# Patient Record
Sex: FEMALE | Race: WHITE | Employment: PART TIME | URBAN - METROPOLITAN AREA
[De-identification: names, ages, dates, MRNs, and addresses within clinical notes are randomized per-mention and may not be internally consistent; named-entity substitution may affect disease eponyms.]

---

## 2021-03-19 LAB — PAP SMEAR, EXTERNAL: NEGATIVE

## 2021-06-10 ENCOUNTER — TELEPHONE (OUTPATIENT)
Dept: PRIMARY CARE CLINIC | Age: 51
End: 2021-06-10

## 2021-06-16 ENCOUNTER — OFFICE VISIT (OUTPATIENT)
Dept: PRIMARY CARE CLINIC | Age: 51
End: 2021-06-16
Payer: COMMERCIAL

## 2021-06-16 VITALS
DIASTOLIC BLOOD PRESSURE: 66 MMHG | TEMPERATURE: 97.1 F | HEIGHT: 64 IN | OXYGEN SATURATION: 97 % | HEART RATE: 96 BPM | WEIGHT: 232 LBS | SYSTOLIC BLOOD PRESSURE: 124 MMHG | BODY MASS INDEX: 39.61 KG/M2

## 2021-06-16 DIAGNOSIS — G47.00 INSOMNIA, UNSPECIFIED TYPE: ICD-10-CM

## 2021-06-16 DIAGNOSIS — R06.00 DYSPNEA, UNSPECIFIED TYPE: Primary | ICD-10-CM

## 2021-06-16 DIAGNOSIS — F41.9 ANXIETY AND DEPRESSION: ICD-10-CM

## 2021-06-16 DIAGNOSIS — N92.1 MENORRHAGIA WITH IRREGULAR CYCLE: ICD-10-CM

## 2021-06-16 DIAGNOSIS — F90.9 ATTENTION DEFICIT HYPERACTIVITY DISORDER (ADHD), UNSPECIFIED ADHD TYPE: ICD-10-CM

## 2021-06-16 DIAGNOSIS — Z00.00 PREVENTATIVE HEALTH CARE: ICD-10-CM

## 2021-06-16 DIAGNOSIS — F32.A ANXIETY AND DEPRESSION: ICD-10-CM

## 2021-06-16 PROCEDURE — G8427 DOCREV CUR MEDS BY ELIG CLIN: HCPCS | Performed by: FAMILY MEDICINE

## 2021-06-16 PROCEDURE — 4004F PT TOBACCO SCREEN RCVD TLK: CPT | Performed by: FAMILY MEDICINE

## 2021-06-16 PROCEDURE — 99203 OFFICE O/P NEW LOW 30 MIN: CPT | Performed by: FAMILY MEDICINE

## 2021-06-16 PROCEDURE — G8419 CALC BMI OUT NRM PARAM NOF/U: HCPCS | Performed by: FAMILY MEDICINE

## 2021-06-16 RX ORDER — MEDROXYPROGESTERONE ACETATE 10 MG/1
TABLET ORAL
COMMUNITY
Start: 2021-06-13 | End: 2022-07-12

## 2021-06-16 RX ORDER — SERTRALINE HYDROCHLORIDE 100 MG/1
TABLET, FILM COATED ORAL
COMMUNITY
Start: 2021-06-14 | End: 2022-05-10 | Stop reason: ALTCHOICE

## 2021-06-16 RX ORDER — DEXTROAMPHETAMINE SACCHARATE, AMPHETAMINE ASPARTATE, DEXTROAMPHETAMINE SULFATE AND AMPHETAMINE SULFATE 2.5; 2.5; 2.5; 2.5 MG/1; MG/1; MG/1; MG/1
TABLET ORAL
COMMUNITY
Start: 2021-06-14 | End: 2021-08-17

## 2021-06-16 RX ORDER — TRAZODONE HYDROCHLORIDE 50 MG/1
TABLET ORAL
COMMUNITY
Start: 2021-06-14 | End: 2022-05-10 | Stop reason: DRUGHIGH

## 2021-06-16 SDOH — ECONOMIC STABILITY: FOOD INSECURITY: WITHIN THE PAST 12 MONTHS, YOU WORRIED THAT YOUR FOOD WOULD RUN OUT BEFORE YOU GOT MONEY TO BUY MORE.: NEVER TRUE

## 2021-06-16 SDOH — ECONOMIC STABILITY: FOOD INSECURITY: WITHIN THE PAST 12 MONTHS, THE FOOD YOU BOUGHT JUST DIDN'T LAST AND YOU DIDN'T HAVE MONEY TO GET MORE.: NEVER TRUE

## 2021-06-16 ASSESSMENT — COLUMBIA-SUICIDE SEVERITY RATING SCALE - C-SSRS
2. HAVE YOU ACTUALLY HAD ANY THOUGHTS OF KILLING YOURSELF?: NO
6. HAVE YOU EVER DONE ANYTHING, STARTED TO DO ANYTHING, OR PREPARED TO DO ANYTHING TO END YOUR LIFE?: NO
1. WITHIN THE PAST MONTH, HAVE YOU WISHED YOU WERE DEAD OR WISHED YOU COULD GO TO SLEEP AND NOT WAKE UP?: NO

## 2021-06-16 ASSESSMENT — PATIENT HEALTH QUESTIONNAIRE - PHQ9
5. POOR APPETITE OR OVEREATING: 1
4. FEELING TIRED OR HAVING LITTLE ENERGY: 1
SUM OF ALL RESPONSES TO PHQ QUESTIONS 1-9: 13
SUM OF ALL RESPONSES TO PHQ QUESTIONS 1-9: 13
SUM OF ALL RESPONSES TO PHQ9 QUESTIONS 1 & 2: 4
10. IF YOU CHECKED OFF ANY PROBLEMS, HOW DIFFICULT HAVE THESE PROBLEMS MADE IT FOR YOU TO DO YOUR WORK, TAKE CARE OF THINGS AT HOME, OR GET ALONG WITH OTHER PEOPLE: 1
8. MOVING OR SPEAKING SO SLOWLY THAT OTHER PEOPLE COULD HAVE NOTICED. OR THE OPPOSITE, BEING SO FIGETY OR RESTLESS THAT YOU HAVE BEEN MOVING AROUND A LOT MORE THAN USUAL: 3
6. FEELING BAD ABOUT YOURSELF - OR THAT YOU ARE A FAILURE OR HAVE LET YOURSELF OR YOUR FAMILY DOWN: 1
3. TROUBLE FALLING OR STAYING ASLEEP: 0
7. TROUBLE CONCENTRATING ON THINGS, SUCH AS READING THE NEWSPAPER OR WATCHING TELEVISION: 3
SUM OF ALL RESPONSES TO PHQ QUESTIONS 1-9: 13
1. LITTLE INTEREST OR PLEASURE IN DOING THINGS: 3
2. FEELING DOWN, DEPRESSED OR HOPELESS: 1
9. THOUGHTS THAT YOU WOULD BE BETTER OFF DEAD, OR OF HURTING YOURSELF: 0

## 2021-06-16 ASSESSMENT — ENCOUNTER SYMPTOMS
NAUSEA: 0
SHORTNESS OF BREATH: 1
VOMITING: 0

## 2021-06-16 ASSESSMENT — SOCIAL DETERMINANTS OF HEALTH (SDOH): HOW HARD IS IT FOR YOU TO PAY FOR THE VERY BASICS LIKE FOOD, HOUSING, MEDICAL CARE, AND HEATING?: SOMEWHAT HARD

## 2021-06-16 NOTE — PROGRESS NOTES
[facility]    Michele Fung presents to the office today for   Chief Complaint   Patient presents with    Shortness of Breath     New patient  Previous PCP Dr. Joann Barlow second opinion on her breathing    Shortness of breath  Since October 2020  Previously worked at [employer]  She thinks she was exposed to powder in the air which aggravated her breathing  She quit job due to breathing issues in Feb 2021  Previous to this job she never had any issues with her breathing  Previous PCP did Chest Xrays, Echo, and other testing  No pulmonary consultation  Inhaler and nebulizer have not been helpful    ADHD  New diagnosis through [facility]  Adderall is a new medication    Anxiety and Depression/ Insomnia  Seeing [facility] in [address] [place] [address] in [place]  Irregular menses  June - Nov 2020 no menses  Nov 2 very heavy bleeding  Jan 2021 no period  Feb, March period  Now intermittent regular  Ultrasound and biopsy negative    Review of Systems   Constitutional: Negative for chills and fever. Respiratory: Positive for shortness of breath. Gastrointestinal: Negative for nausea and vomiting. Genitourinary: Negative for dysuria, hematuria and urgency. Skin: Negative for rash. /66   Pulse 96   Temp 97.1 °F (36.2 °C) (Temporal)   Ht 5' 4\" (1.626 m)   Wt 232 lb (105.2 kg)   SpO2 97%   BMI 39.82 kg/m²   Physical Exam  Constitutional:       Appearance: Normal appearance. HENT:      Head: Normocephalic and atraumatic. Eyes:      Extraocular Movements: Extraocular movements intact. Conjunctiva/sclera: Conjunctivae normal.   Cardiovascular:      Rate and Rhythm: Normal rate. Heart sounds: Normal heart sounds. Pulmonary:      Effort: Pulmonary effort is normal.      Breath sounds: Normal breath sounds. Skin:     General: Skin is warm. Neurological:      Mental Status: She is alert and oriented to person, place, and time.    Psychiatric:         Mood and

## 2021-06-17 DIAGNOSIS — Z00.00 PREVENTATIVE HEALTH CARE: ICD-10-CM

## 2021-06-17 LAB
ALBUMIN SERPL-MCNC: 4.3 G/DL (ref 3.5–5.2)
ALP BLD-CCNC: 95 U/L (ref 35–104)
ALT SERPL-CCNC: 20 U/L (ref 0–32)
ANION GAP SERPL CALCULATED.3IONS-SCNC: 13 MMOL/L (ref 7–16)
AST SERPL-CCNC: 19 U/L (ref 0–31)
BILIRUB SERPL-MCNC: 0.4 MG/DL (ref 0–1.2)
BUN BLDV-MCNC: 14 MG/DL (ref 6–20)
CALCIUM SERPL-MCNC: 9.5 MG/DL (ref 8.6–10.2)
CHLORIDE BLD-SCNC: 107 MMOL/L (ref 98–107)
CHOLESTEROL, TOTAL: 234 MG/DL (ref 0–199)
CO2: 21 MMOL/L (ref 22–29)
CREAT SERPL-MCNC: 1.1 MG/DL (ref 0.5–1)
GFR AFRICAN AMERICAN: >60
GFR NON-AFRICAN AMERICAN: 53 ML/MIN/1.73
GLUCOSE BLD-MCNC: 69 MG/DL (ref 74–99)
HCT VFR BLD CALC: 48.7 % (ref 34–48)
HDLC SERPL-MCNC: 53 MG/DL
HEMOGLOBIN: 15.5 G/DL (ref 11.5–15.5)
LDL CHOLESTEROL CALCULATED: 161 MG/DL (ref 0–99)
MCH RBC QN AUTO: 28.8 PG (ref 26–35)
MCHC RBC AUTO-ENTMCNC: 31.8 % (ref 32–34.5)
MCV RBC AUTO: 90.4 FL (ref 80–99.9)
PDW BLD-RTO: 13.5 FL (ref 11.5–15)
PLATELET # BLD: 221 E9/L (ref 130–450)
PMV BLD AUTO: 10.6 FL (ref 7–12)
POTASSIUM SERPL-SCNC: 4.4 MMOL/L (ref 3.5–5)
RBC # BLD: 5.39 E12/L (ref 3.5–5.5)
SODIUM BLD-SCNC: 141 MMOL/L (ref 132–146)
TOTAL PROTEIN: 7.2 G/DL (ref 6.4–8.3)
TRIGL SERPL-MCNC: 102 MG/DL (ref 0–149)
TSH SERPL DL<=0.05 MIU/L-ACNC: 0.99 UIU/ML (ref 0.27–4.2)
VLDLC SERPL CALC-MCNC: 20 MG/DL
WBC # BLD: 5.5 E9/L (ref 4.5–11.5)

## 2021-07-19 ENCOUNTER — TELEPHONE (OUTPATIENT)
Dept: PRIMARY CARE CLINIC | Age: 51
End: 2021-07-19

## 2021-07-19 DIAGNOSIS — R06.00 DYSPNEA, UNSPECIFIED TYPE: Primary | ICD-10-CM

## 2021-07-19 NOTE — TELEPHONE ENCOUNTER
Saw prior records on file, and  per doctors 6/16 note: We need to get previous PCP records to confirm what testing has been completed and fill in work up. Dr. Jong Amato, please advise after your return at your earliest convenience. Patient notified provider unavailable in office this week, and that we will get back to her asap.

## 2021-07-19 NOTE — TELEPHONE ENCOUNTER
----- Message from Suys Jimenez sent at 7/19/2021 11:00 AM EDT -----  Subject: Message to Provider    QUESTIONS  Information for Provider? Patient called in to inquire if her medical   records had been sent over to the office from her former PCP's office. She   would also like to know if her current PCP advises for any further action; If she needs to schedule and apt with her PCP.  ---------------------------------------------------------------------------  --------------  CALL BACK INFO  What is the best way for the office to contact you? OK to leave message on   voicemail  Preferred Call Back Phone Number? [phone]  ---------------------------------------------------------------------------  --------------  SCRIPT ANSWERS  Relationship to Patient?  Self

## 2021-07-26 NOTE — TELEPHONE ENCOUNTER
Patient agreed to Pulmonology referral as recommended by provider, would like called once referral placed with pulmonary provider/facility information if possible. If appropriate please place referral order at your earliest convenience.

## 2021-07-27 ENCOUNTER — TELEPHONE (OUTPATIENT)
Dept: PRIMARY CARE CLINIC | Age: 51
End: 2021-07-27

## 2021-07-27 DIAGNOSIS — R06.00 DYSPNEA, UNSPECIFIED TYPE: Primary | ICD-10-CM

## 2021-07-27 NOTE — TELEPHONE ENCOUNTER
Tell pt Dr. Jaylin Lindsey is no longer in [place]  The closest pulmonologist would be in [facility] or [place]  Does she have a preference?

## 2021-07-27 NOTE — TELEPHONE ENCOUNTER
Dr. Arboleda [facility] office replied to referral stating the following:     Please be advised that Dr. Micki Crump is not with our practice any longer, so we can not accept this referral.    Juan Palmer and please advise at your earliest convenience.

## 2021-07-27 NOTE — TELEPHONE ENCOUNTER
Referral and PCP records faxed to Dr. Bonnetta Habermann office as ordered by doctor. Patient notified referral sent and they will call her to schedule. Gave Pulmonary office info to patient to call if needed to check the status.

## 2021-07-27 NOTE — TELEPHONE ENCOUNTER
Patient called back, does not have a specific Pulm doctor/name she is aware of, but requesting [place] area first if provider has any recommendations, then [place] ok if needed otherwise. Please advise.

## 2021-07-28 NOTE — TELEPHONE ENCOUNTER
See new telephone encounter placed if needed but, Dr. Duarte Gale referral cancelled, and referral for [facility] in [place] placed and faxed. Patient notified of update.

## 2021-07-28 NOTE — TELEPHONE ENCOUNTER
Referral and required provider records faxed to [facility]. Fax confirmation they received, obtained. They will call patient to schedule. See referral documentation if needed for further info. Jennifer Tam notified patient of update on referral/Pulm info.

## 2021-08-09 ENCOUNTER — OFFICE VISIT (OUTPATIENT)
Dept: PRIMARY CARE CLINIC | Age: 51
End: 2021-08-09
Payer: COMMERCIAL

## 2021-08-09 ENCOUNTER — TELEPHONE (OUTPATIENT)
Dept: PRIMARY CARE CLINIC | Age: 51
End: 2021-08-09

## 2021-08-09 VITALS
WEIGHT: 231 LBS | TEMPERATURE: 97.4 F | BODY MASS INDEX: 39.65 KG/M2 | OXYGEN SATURATION: 97 % | SYSTOLIC BLOOD PRESSURE: 120 MMHG | DIASTOLIC BLOOD PRESSURE: 74 MMHG | HEART RATE: 76 BPM

## 2021-08-09 DIAGNOSIS — R07.89 INTERMITTENT LEFT-SIDED CHEST PAIN: ICD-10-CM

## 2021-08-09 DIAGNOSIS — R00.2 PALPITATION: Primary | ICD-10-CM

## 2021-08-09 PROCEDURE — 1036F TOBACCO NON-USER: CPT | Performed by: NURSE PRACTITIONER

## 2021-08-09 PROCEDURE — 99213 OFFICE O/P EST LOW 20 MIN: CPT | Performed by: NURSE PRACTITIONER

## 2021-08-09 PROCEDURE — 93000 ELECTROCARDIOGRAM COMPLETE: CPT | Performed by: NURSE PRACTITIONER

## 2021-08-09 PROCEDURE — G8427 DOCREV CUR MEDS BY ELIG CLIN: HCPCS | Performed by: NURSE PRACTITIONER

## 2021-08-09 PROCEDURE — G8417 CALC BMI ABV UP PARAM F/U: HCPCS | Performed by: NURSE PRACTITIONER

## 2021-08-17 ENCOUNTER — OFFICE VISIT (OUTPATIENT)
Dept: PRIMARY CARE CLINIC | Age: 51
End: 2021-08-17
Payer: COMMERCIAL

## 2021-08-17 VITALS
SYSTOLIC BLOOD PRESSURE: 122 MMHG | BODY MASS INDEX: 39.27 KG/M2 | TEMPERATURE: 97 F | OXYGEN SATURATION: 98 % | DIASTOLIC BLOOD PRESSURE: 82 MMHG | HEIGHT: 64 IN | WEIGHT: 230 LBS | HEART RATE: 69 BPM

## 2021-08-17 DIAGNOSIS — R07.9 CHEST PAIN, UNSPECIFIED TYPE: Primary | ICD-10-CM

## 2021-08-17 DIAGNOSIS — Z12.31 SCREENING MAMMOGRAM, ENCOUNTER FOR: ICD-10-CM

## 2021-08-17 PROCEDURE — 1036F TOBACCO NON-USER: CPT | Performed by: FAMILY MEDICINE

## 2021-08-17 PROCEDURE — G8417 CALC BMI ABV UP PARAM F/U: HCPCS | Performed by: FAMILY MEDICINE

## 2021-08-17 PROCEDURE — 99213 OFFICE O/P EST LOW 20 MIN: CPT | Performed by: FAMILY MEDICINE

## 2021-08-17 PROCEDURE — G8427 DOCREV CUR MEDS BY ELIG CLIN: HCPCS | Performed by: FAMILY MEDICINE

## 2021-08-17 ASSESSMENT — ENCOUNTER SYMPTOMS
SHORTNESS OF BREATH: 0
WHEEZING: 0
VOMITING: 0
NAUSEA: 0

## 2021-08-17 NOTE — PROGRESS NOTES
[facility]    Jalen Gilliland presents to the office today for   Chief Complaint   Patient presents with    Palpitations     follow up     Palpitations/chest pain  Adderall stopped and palpitations resolved but still having intermittent chest pain  It does radiate into her left arm  Sometimes worse with exertion  No chest xray or stress test for this issue yet      Review of Systems   Constitutional: Negative for chills and fever. Respiratory: Negative for shortness of breath and wheezing. Cardiovascular: Positive for chest pain. Gastrointestinal: Negative for nausea and vomiting. Genitourinary: Negative for dysuria, hematuria and urgency. Skin: Negative for rash. Neurological: Negative for dizziness and light-headedness. /82   Pulse 69   Temp 97 °F (36.1 °C) (Temporal)   Ht 5' 4\" (1.626 m)   Wt 230 lb (104.3 kg)   SpO2 98%   BMI 39.48 kg/m²   Physical Exam  Constitutional:       Appearance: Normal appearance. HENT:      Head: Normocephalic and atraumatic. Eyes:      Extraocular Movements: Extraocular movements intact. Conjunctiva/sclera: Conjunctivae normal.   Cardiovascular:      Rate and Rhythm: Normal rate. Heart sounds: Normal heart sounds. Pulmonary:      Effort: Pulmonary effort is normal.      Breath sounds: Normal breath sounds. Skin:     General: Skin is warm. Neurological:      Mental Status: She is alert and oriented to person, place, and time. Psychiatric:         Mood and Affect: Mood normal.         Behavior: Behavior normal.            Current Outpatient Medications:     traZODone (DESYREL) 50 MG tablet, take 1 TO 2 tablets by mouth at bedtime, Disp: , Rfl:     sertraline (ZOLOFT) 100 MG tablet, take 1 tablet by mouth once daily, Disp: , Rfl:     medroxyPROGESTERone (PROVERA) 10 MG tablet, take 1 tablet by mouth once daily, Disp: , Rfl:      No past medical history on file. Krupa Mylesulices was seen today for palpitations.     Diagnoses and all orders for this visit:    Chest pain, unspecified type  -     CARDIAC STRESS TEST EXERCISE ONLY; Future  -     XR CHEST (2 VW); Future    Screening mammogram, encounter for  -     KANDACE ALEENA DIGITAL SCREEN BILATERAL PER PROTOCOL;  Future       CXR and stress test  ER precautions reviewed   Mammogram at [facility]

## 2021-09-16 DIAGNOSIS — R07.9 CHEST PAIN, UNSPECIFIED TYPE: ICD-10-CM

## 2021-09-21 DIAGNOSIS — R07.9 CHEST PAIN, UNSPECIFIED TYPE: Primary | ICD-10-CM

## 2021-09-23 ENCOUNTER — TELEPHONE (OUTPATIENT)
Dept: PRIMARY CARE CLINIC | Age: 51
End: 2021-09-23

## 2021-09-23 DIAGNOSIS — R07.9 CHEST PAIN, UNSPECIFIED TYPE: Primary | ICD-10-CM

## 2021-09-23 NOTE — TELEPHONE ENCOUNTER
Pt just checked with her insurance and [facility]. Could she please be referred there?

## 2021-09-23 NOTE — TELEPHONE ENCOUNTER
Spoke to Dr. Roque Quintanilla and they do not take pts insurance. Called and informed pt of this and told her to call her insurance and see who is a preferred provider and then to call me back.

## 2021-10-18 ENCOUNTER — HOSPITAL ENCOUNTER (OUTPATIENT)
Dept: MAMMOGRAPHY | Age: 51
Discharge: HOME | End: 2021-10-20
Payer: COMMERCIAL

## 2021-10-18 DIAGNOSIS — Z12.31 SCREENING MAMMOGRAM, ENCOUNTER FOR: ICD-10-CM

## 2021-10-18 PROCEDURE — 77063 BREAST TOMOSYNTHESIS BI: CPT

## 2021-11-04 ENCOUNTER — OFFICE VISIT (OUTPATIENT)
Dept: PRIMARY CARE CLINIC | Age: 51
End: 2021-11-04
Payer: COMMERCIAL

## 2021-11-04 VITALS
BODY MASS INDEX: 38.62 KG/M2 | OXYGEN SATURATION: 98 % | SYSTOLIC BLOOD PRESSURE: 112 MMHG | DIASTOLIC BLOOD PRESSURE: 82 MMHG | RESPIRATION RATE: 18 BRPM | WEIGHT: 225 LBS | HEART RATE: 76 BPM | TEMPERATURE: 97.7 F

## 2021-11-04 DIAGNOSIS — R07.9 CHEST PAIN, UNSPECIFIED TYPE: ICD-10-CM

## 2021-11-04 DIAGNOSIS — R07.9 CHEST PAIN, UNSPECIFIED TYPE: Primary | ICD-10-CM

## 2021-11-04 DIAGNOSIS — R05.9 COUGH: ICD-10-CM

## 2021-11-04 LAB
ALBUMIN SERPL-MCNC: 4.2 G/DL (ref 3.5–5.2)
ALP BLD-CCNC: 93 U/L (ref 35–104)
ALT SERPL-CCNC: 15 U/L (ref 0–32)
ANION GAP SERPL CALCULATED.3IONS-SCNC: 9 MMOL/L (ref 7–16)
AST SERPL-CCNC: 18 U/L (ref 0–31)
BASOPHILS ABSOLUTE: 0.03 E9/L (ref 0–0.2)
BASOPHILS RELATIVE PERCENT: 0.4 % (ref 0–2)
BILIRUB SERPL-MCNC: 0.4 MG/DL (ref 0–1.2)
BUN BLDV-MCNC: 12 MG/DL (ref 6–20)
CALCIUM SERPL-MCNC: 9.1 MG/DL (ref 8.6–10.2)
CHLORIDE BLD-SCNC: 104 MMOL/L (ref 98–107)
CO2: 24 MMOL/L (ref 22–29)
CREAT SERPL-MCNC: 0.9 MG/DL (ref 0.5–1)
D DIMER: <200 NG/ML DDU
EOSINOPHILS ABSOLUTE: 0.14 E9/L (ref 0.05–0.5)
EOSINOPHILS RELATIVE PERCENT: 1.8 % (ref 0–6)
GFR AFRICAN AMERICAN: >60
GFR NON-AFRICAN AMERICAN: >60 ML/MIN/1.73
GLUCOSE BLD-MCNC: 76 MG/DL (ref 74–99)
HCT VFR BLD CALC: 44.3 % (ref 34–48)
HEMOGLOBIN: 15 G/DL (ref 11.5–15.5)
IMMATURE GRANULOCYTES #: 0.02 E9/L
IMMATURE GRANULOCYTES %: 0.3 % (ref 0–5)
LYMPHOCYTES ABSOLUTE: 1.5 E9/L (ref 1.5–4)
LYMPHOCYTES RELATIVE PERCENT: 19.7 % (ref 20–42)
Lab: NORMAL
MCH RBC QN AUTO: 29.2 PG (ref 26–35)
MCHC RBC AUTO-ENTMCNC: 33.9 % (ref 32–34.5)
MCV RBC AUTO: 86.2 FL (ref 80–99.9)
MONOCYTES ABSOLUTE: 0.56 E9/L (ref 0.1–0.95)
MONOCYTES RELATIVE PERCENT: 7.3 % (ref 2–12)
NEUTROPHILS ABSOLUTE: 5.37 E9/L (ref 1.8–7.3)
NEUTROPHILS RELATIVE PERCENT: 70.5 % (ref 43–80)
PDW BLD-RTO: 12.8 FL (ref 11.5–15)
PERFORMING INSTRUMENT: NORMAL
PLATELET # BLD: 240 E9/L (ref 130–450)
PMV BLD AUTO: 10.3 FL (ref 7–12)
POTASSIUM SERPL-SCNC: 4.8 MMOL/L (ref 3.5–5)
QC PASS/FAIL: NORMAL
RBC # BLD: 5.14 E12/L (ref 3.5–5.5)
SARS-COV-2, POC: NORMAL
SODIUM BLD-SCNC: 137 MMOL/L (ref 132–146)
TOTAL PROTEIN: 7 G/DL (ref 6.4–8.3)
WBC # BLD: 7.6 E9/L (ref 4.5–11.5)

## 2021-11-04 PROCEDURE — 1036F TOBACCO NON-USER: CPT | Performed by: STUDENT IN AN ORGANIZED HEALTH CARE EDUCATION/TRAINING PROGRAM

## 2021-11-04 PROCEDURE — G8427 DOCREV CUR MEDS BY ELIG CLIN: HCPCS | Performed by: STUDENT IN AN ORGANIZED HEALTH CARE EDUCATION/TRAINING PROGRAM

## 2021-11-04 PROCEDURE — 93000 ELECTROCARDIOGRAM COMPLETE: CPT | Performed by: STUDENT IN AN ORGANIZED HEALTH CARE EDUCATION/TRAINING PROGRAM

## 2021-11-04 PROCEDURE — G8484 FLU IMMUNIZE NO ADMIN: HCPCS | Performed by: STUDENT IN AN ORGANIZED HEALTH CARE EDUCATION/TRAINING PROGRAM

## 2021-11-04 PROCEDURE — 87426 SARSCOV CORONAVIRUS AG IA: CPT | Performed by: STUDENT IN AN ORGANIZED HEALTH CARE EDUCATION/TRAINING PROGRAM

## 2021-11-04 PROCEDURE — G8417 CALC BMI ABV UP PARAM F/U: HCPCS | Performed by: STUDENT IN AN ORGANIZED HEALTH CARE EDUCATION/TRAINING PROGRAM

## 2021-11-04 PROCEDURE — 99214 OFFICE O/P EST MOD 30 MIN: CPT | Performed by: STUDENT IN AN ORGANIZED HEALTH CARE EDUCATION/TRAINING PROGRAM

## 2021-11-04 RX ORDER — LISDEXAMFETAMINE DIMESYLATE 20 MG/1
CAPSULE ORAL
COMMUNITY
Start: 2021-10-05 | End: 2022-05-10 | Stop reason: DRUGHIGH

## 2021-11-04 RX ORDER — FLUTICASONE PROPIONATE 220 UG/1
AEROSOL, METERED RESPIRATORY (INHALATION)
COMMUNITY
Start: 2021-10-29 | End: 2022-07-12 | Stop reason: ALTCHOICE

## 2021-11-04 RX ORDER — ALBUTEROL SULFATE 90 UG/1
AEROSOL, METERED RESPIRATORY (INHALATION)
COMMUNITY
Start: 2021-10-27 | End: 2022-10-31 | Stop reason: ALTCHOICE

## 2021-11-04 ASSESSMENT — ENCOUNTER SYMPTOMS
WHEEZING: 0
DIARRHEA: 0
NAUSEA: 0
VOMITING: 0
RHINORRHEA: 0
BLOOD IN STOOL: 0
SHORTNESS OF BREATH: 1
CONSTIPATION: 0
COUGH: 1
SORE THROAT: 0

## 2021-11-04 NOTE — PROGRESS NOTES
WALK-IN CARE CLINIC VISIT    21  Name: Mack Lopez   : 1970   Age: 54 y.o. Sex: female        Assessment & Plan:       ICD-10-CM    1. Chest pain, unspecified type  R07.9 EKG 12 lead     XR CHEST STANDARD (2 VW)     POCT COVID-19, Antigen     D-DIMER, QUANTITATIVE     CBC WITH AUTO DIFFERENTIAL     COMPREHENSIVE METABOLIC PANEL   2. Cough  R05.9        Chest pain is atypical in nature, reproducible on exam. Differential diagnosis includes ACS, PE, pneumonia, COVID, MSK pain. Vitals WNL. EKG NSR. Has recent stress test that was negative for ischemia although heart rate did not get high enough to exclude ischemia completely. Has chronic shortness of breath but also new cough. Rapid COVID test negative. Declined sendout COVID test. CXR unremarkable. Wells score for PE 0, low risk. However PERC rule 1, not PERC negative due to medroxyprogesterone use. Will check stat D-dimer. Will also check CBC, CMP in setting of chest pain and new cough. Discussed ED precautions with patient for worsening or new symptoms. Patient endorsed understanding. Will call with STAT labs, plan to send to ED for PE rule out if elevated D-dimer. Counseled patient regarding above diagnosis, including possible risks and complications. The patient verbalizes understanding, and is in agreement with the plan as detailed above. All educational materials and instructions were discussed and included on the After Visit Summary. All questions answered to the patient's satisfaction. The patient was advised to call for any concerns or questions. This provider was wearing N95 mask and shield. The patient was wearing surgical mask. We practiced social distancing when appropriate during visit due to COVID-19 pandemic. Subjective:     Chief Complaint   Patient presents with    Shoulder Pain     into right chest, hurts to breath, sob, non-prod.  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 HENT: Negative for congestion, rhinorrhea and sore throat. Respiratory: Positive for cough and shortness of breath. Negative for wheezing. Cardiovascular: Positive for chest pain and leg swelling. Negative for palpitations. Gastrointestinal: Negative for blood in stool, constipation, diarrhea, nausea and vomiting. Musculoskeletal: Positive for arthralgias. Skin: Negative for rash. Neurological: Positive for light-headedness and headaches (chronic). Medical History:     Patient Active Problem List   Diagnosis    Dyspnea    Attention deficit hyperactivity disorder (ADHD)    Menorrhagia with irregular cycle    Anxiety and depression    Insomnia        History reviewed. No pertinent past medical history. History reviewed. No pertinent surgical history. History reviewed. No pertinent family history.     Medications:     Current Outpatient Medications:     VYVANSE 20 MG CAPS, take 1 capsule by mouth every morning, Disp: , Rfl:     FLOVENT  MCG/ACT inhaler, inhale 2 puffs by mouth and INTO THE LUNGS twice a day, Disp: , Rfl:     albuterol sulfate  (90 Base) MCG/ACT inhaler, inhale 2 puffs by mouth and INTO THE LUNGS four times a day if needed for shortness of breath, Disp: , Rfl:     traZODone (DESYREL) 50 MG tablet, take 1 TO 2 tablets by mouth at bedtime, Disp: , Rfl:     sertraline (ZOLOFT) 100 MG tablet, take 1 tablet by mouth once daily, Disp: , Rfl:     medroxyPROGESTERone (PROVERA) 10 MG tablet, take 1 tablet by mouth once daily, Disp: , Rfl:     Allergies: Allergies   Allergen Reactions    Latex     Adderall [Amphetamine-Dextroamphetamine]     Nortriptyline     Propranolol     Topiramate     Wellbutrin [Bupropion]        Social History:     Social History     Socioeconomic History    Marital status: Unknown     Spouse name: Not on file    Number of children: Not on file    Years of education: Not on file    Highest education level: Not on file   Occupational History    Not on file   Tobacco Use    Smoking status: Former Smoker     Quit date:      Years since quittin.8    Smokeless tobacco: Never Used   Substance and Sexual Activity    Alcohol use: Not on file    Drug use: Not on file    Sexual activity: Not on file   Other Topics Concern    Not on file   Social History Narrative    Not on file     Social Determinants of Health     Financial Resource Strain: Medium Risk    Difficulty of Paying Living Expenses: Somewhat hard   Food Insecurity: No Food Insecurity    Worried About Running Out of Food in the Last Year: Never true    Cira of Food in the Last Year: Never true   Transportation Needs:     Lack of Transportation (Medical):      Lack of Transportation (Non-Medical):    Physical Activity:     Days of Exercise per Week:     Minutes of Exercise per Session:    Stress:     Feeling of Stress :    Social Connections:     Frequency of Communication with Friends and Family:     Frequency of Social Gatherings with Friends and Family:     Attends Yarsani Services:     Active Member of Clubs or Organizations:     Attends Club or Organization Meetings:     Marital Status:    Intimate Partner Violence:     Fear of Current or Ex-Partner:     Emotionally Abused:     Physically Abused:     Sexually Abused: Physical Exam:     Vitals:    11/04/21 0836 11/04/21 0851   BP: (!) 124/90 112/82   Site: Right Upper Arm Right Upper Arm   Position: Sitting Supine   Cuff Size: Large Adult Large Adult   Pulse: 76    Resp: 18    Temp: 97.7 °F (36.5 °C)    TempSrc: Temporal    SpO2: 98%    Weight: 225 lb (102.1 kg)         Physical Exam  Vitals and nursing note reviewed. Constitutional:       General: She is not in acute distress. Appearance: Normal appearance. She is obese. She is not ill-appearing or diaphoretic. HENT:      Mouth/Throat:      Mouth: Mucous membranes are moist.      Pharynx: Oropharynx is clear. Eyes:      Extraocular Movements: Extraocular movements intact. Conjunctiva/sclera: Conjunctivae normal.   Cardiovascular:      Rate and Rhythm: Normal rate and regular rhythm. Pulses: Normal pulses. Heart sounds: Normal heart sounds. No murmur heard. No friction rub. No gallop. Pulmonary:      Effort: Pulmonary effort is normal. No respiratory distress. Breath sounds: Normal breath sounds. No stridor. No wheezing, rhonchi or rales. Chest:      Chest wall: Tenderness (right delano-lateral chest wall) present. No mass, lacerations, deformity, swelling, crepitus or edema. Comments: No skin changes. Musculoskeletal:      Right shoulder: Normal. No tenderness. Thoracic back: Tenderness (radiating from right side) present. Right lower leg: Edema (trace) present. Left lower leg: Edema (trace) present. Skin:     General: Skin is warm and dry. Capillary Refill: Capillary refill takes less than 2 seconds. Neurological:      Mental Status: She is alert and oriented to person, place, and time.    Psychiatric:         Mood and Affect: Mood normal.         Behavior: Behavior normal.         Testing:     Orders Placed This Encounter   Procedures    XR CHEST STANDARD (2 VW)     Standing Status:   Future     Number of Occurrences:   1     Standing Expiration Date: 11/4/2022     Order Specific Question:   Reason for exam:     Answer:   right side and chest pain    D-DIMER, QUANTITATIVE     Standing Status:   Future     Number of Occurrences:   1     Standing Expiration Date:   11/4/2022    CBC WITH AUTO DIFFERENTIAL     Standing Status:   Future     Number of Occurrences:   1     Standing Expiration Date:   11/4/2022    COMPREHENSIVE METABOLIC PANEL     Standing Status:   Future     Number of Occurrences:   1     Standing Expiration Date:   11/4/2022    POCT COVID-19, Antigen     Order Specific Question:   Is this test for diagnosis or screening? Answer:   Diagnosis of ill patient     Order Specific Question:   Symptomatic for COVID-19 as defined by CDC? Answer:   Yes     Order Specific Question:   Date of Symptom Onset     Answer:   11/1/2021     Order Specific Question:   Hospitalized for COVID-19? Answer:   No     Order Specific Question:   Admitted to ICU for COVID-19? Answer:   No     Order Specific Question:   Employed in healthcare setting? Answer:   No     Order Specific Question:   Resident in a congregate (group) care setting? Answer:   No     Order Specific Question:   Pregnant? Answer:   No    EKG 12 lead     Order Specific Question:   Reason for Exam?     Answer:   Chest pain        Recent Results (from the past 24 hour(s))   POCT COVID-19, Antigen    Collection Time: 11/04/21  9:14 AM   Result Value Ref Range    SARS-COV-2, POC Not-Detected Not Detected    Lot Number [id number]     QC Pass/Fail pass     Performing Instrument BD Veritor      Narrative   EXAMINATION:   TWO XRAY VIEWS OF THE CHEST       11/4/2021 9:19 am       COMPARISON:   None.       HISTORY:   ORDERING SYSTEM PROVIDED HISTORY: Chest pain, unspecified type   TECHNOLOGIST PROVIDED HISTORY:   Reason for exam:->right side and chest pain       FINDINGS:   The lungs are without acute focal process.  There is no effusion or   pneumothorax.  The cardiomediastinal silhouette

## 2022-05-10 ENCOUNTER — OFFICE VISIT (OUTPATIENT)
Dept: PRIMARY CARE CLINIC | Age: 52
End: 2022-05-10
Payer: COMMERCIAL

## 2022-05-10 VITALS
OXYGEN SATURATION: 96 % | HEART RATE: 75 BPM | SYSTOLIC BLOOD PRESSURE: 115 MMHG | HEIGHT: 64 IN | WEIGHT: 205.8 LBS | DIASTOLIC BLOOD PRESSURE: 86 MMHG | TEMPERATURE: 97.8 F | BODY MASS INDEX: 35.13 KG/M2

## 2022-05-10 DIAGNOSIS — K59.00 CONSTIPATION, UNSPECIFIED CONSTIPATION TYPE: ICD-10-CM

## 2022-05-10 DIAGNOSIS — K64.9 HEMORRHOIDS, UNSPECIFIED HEMORRHOID TYPE: ICD-10-CM

## 2022-05-10 DIAGNOSIS — I49.3 SYMPTOMATIC PVCS: ICD-10-CM

## 2022-05-10 DIAGNOSIS — Z76.89 ENCOUNTER TO ESTABLISH CARE WITH NEW DOCTOR: Primary | ICD-10-CM

## 2022-05-10 DIAGNOSIS — K21.9 GASTROESOPHAGEAL REFLUX DISEASE, UNSPECIFIED WHETHER ESOPHAGITIS PRESENT: ICD-10-CM

## 2022-05-10 DIAGNOSIS — R06.09 DYSPNEA ON EXERTION: ICD-10-CM

## 2022-05-10 DIAGNOSIS — Z12.12 SCREENING FOR COLORECTAL CANCER: ICD-10-CM

## 2022-05-10 DIAGNOSIS — Z12.11 SCREENING FOR COLORECTAL CANCER: ICD-10-CM

## 2022-05-10 PROBLEM — G43.909 MIGRAINE: Status: ACTIVE | Noted: 2018-09-18

## 2022-05-10 PROCEDURE — G8417 CALC BMI ABV UP PARAM F/U: HCPCS | Performed by: STUDENT IN AN ORGANIZED HEALTH CARE EDUCATION/TRAINING PROGRAM

## 2022-05-10 PROCEDURE — 99214 OFFICE O/P EST MOD 30 MIN: CPT | Performed by: STUDENT IN AN ORGANIZED HEALTH CARE EDUCATION/TRAINING PROGRAM

## 2022-05-10 PROCEDURE — 1036F TOBACCO NON-USER: CPT | Performed by: STUDENT IN AN ORGANIZED HEALTH CARE EDUCATION/TRAINING PROGRAM

## 2022-05-10 PROCEDURE — 3017F COLORECTAL CA SCREEN DOC REV: CPT | Performed by: STUDENT IN AN ORGANIZED HEALTH CARE EDUCATION/TRAINING PROGRAM

## 2022-05-10 PROCEDURE — G8427 DOCREV CUR MEDS BY ELIG CLIN: HCPCS | Performed by: STUDENT IN AN ORGANIZED HEALTH CARE EDUCATION/TRAINING PROGRAM

## 2022-05-10 RX ORDER — LISDEXAMFETAMINE DIMESYLATE 30 MG/1
CAPSULE ORAL
COMMUNITY
Start: 2022-05-02 | End: 2022-07-12

## 2022-05-10 RX ORDER — TRAZODONE HYDROCHLORIDE 150 MG/1
300 TABLET ORAL
COMMUNITY
Start: 2022-05-02

## 2022-05-10 RX ORDER — POLYETHYLENE GLYCOL 3350 17 G/17G
17 POWDER, FOR SOLUTION ORAL DAILY PRN
Qty: 510 G | Refills: 5 | Status: SHIPPED
Start: 2022-05-10 | End: 2022-10-31 | Stop reason: ALTCHOICE

## 2022-05-10 RX ORDER — OMEPRAZOLE 40 MG/1
40 CAPSULE, DELAYED RELEASE ORAL
Qty: 30 CAPSULE | Refills: 5 | Status: SHIPPED
Start: 2022-05-10 | End: 2022-10-24

## 2022-05-10 RX ORDER — VORTIOXETINE 10 MG/1
TABLET, FILM COATED ORAL
COMMUNITY
Start: 2022-05-03 | End: 2022-08-25

## 2022-05-10 ASSESSMENT — ENCOUNTER SYMPTOMS
EYE DISCHARGE: 1
NAUSEA: 1
ANAL BLEEDING: 1
COUGH: 0
DIARRHEA: 0
CONSTIPATION: 1
ABDOMINAL PAIN: 1
SHORTNESS OF BREATH: 1

## 2022-05-10 NOTE — PROGRESS NOTES
NEW PRIMARY CARE VISIT    5/10/22  Name: Trang Hobbs   : 1970   Age: 50 y.o. Sex: female        Assessment & Plan:     Problem List Items Addressed This Visit        Circulatory    Symptomatic PVCs     Normal stress test  Unable to tolerate beta-blocker  Continue aspirin         Relevant Medications    ASPIRIN 81 PO    Hemorrhoids     Secondary to constipation  Start MiraLAX         Relevant Medications    polyethylene glycol (GLYCOLAX) 17 GM/SCOOP powder       Digestive    Gastroesophageal reflux disease     Start omeprazole  Referral to GI, consider scope if not improved         Relevant Medications    polyethylene glycol (GLYCOLAX) 17 GM/SCOOP powder    omeprazole (PRILOSEC) 40 MG delayed release capsule       Other    Constipation     Start MiraLAX  Referral to GI for colonoscopy         Relevant Medications    polyethylene glycol (GLYCOLAX) 17 GM/SCOOP powder    Dyspnea     Possible allergies versus asthma, PFTs unremarkable  Was started on Flovent, discussed proper use and recommended to continue using prior to pulmonology follow-up  Continue albuterol as needed           Other Visit Diagnoses     Encounter to establish care with new doctor    -  Primary    History reviewed and updated    Screening for colorectal cancer        Relevant Orders    External Referral To Gastroenterology          Counseled patient regarding above diagnosis, including possible risks and complications, especially if left uncontrolled. Counseled patient as appropriate and relevant regarding any possible side effects, risks, and alternatives to treatment; the patient verbalizes understanding, and is in agreement with the plan as detailed above. All educational materials and instructions were discussed and included on the After Visit Summary. All questions answered to the patient's satisfaction. The patient was advised to call or send [employer] message for any concerns prior to next appointment.     Return in about 2 months (around 7/10/2022) for follow-up nausea, cough. This provider and patient were wearing surgical masks and practiced social distancing when appropriate during visit due to COVID-19 pandemic. Subjective:     Chief Complaint   Patient presents with    New Patient     physical exam       Patient needs new PCP. Last PCP changed practice locations. Saw cardiology Dr. James Kidd. Was on heart monitor for 2 weeks. Intermittent extra beats. Otherwise normal. Normal stress test.  Was not able to tolerate metoprolol. Was started on aspirin for cardiovascular disease prevention. Saw pulmonology in [place]. Was started on Flovent. Has not been using as prescribed. Follows with psych. Follows with OB for Provera for AUB. Review of Systems   Constitutional: Negative for chills and fever. HENT: Positive for congestion. Eyes: Positive for discharge (watery). Negative for visual disturbance. Respiratory: Positive for shortness of breath. Negative for cough. Cardiovascular: Positive for palpitations. Negative for chest pain and leg swelling. Gastrointestinal: Positive for abdominal pain, anal bleeding (hemorrhoids), constipation and nausea. Negative for diarrhea. Genitourinary: Positive for menstrual problem. Negative for difficulty urinating and dysuria. Allergic/Immunologic: Positive for environmental allergies. Medical History:   History reviewed and updated as needed. Patient Active Problem List   Diagnosis    Dyspnea    Attention deficit hyperactivity disorder (ADHD)    Menorrhagia with irregular cycle    Anxiety and depression    Insomnia    Migraine    Symptomatic PVCs        History reviewed. No pertinent past medical history.     Past Surgical History:   Procedure Laterality Date    TUBAL LIGATION      VENTRAL HERNIA REPAIR      WISDOM TOOTH EXTRACTION         Family History   Problem Relation Age of Onset    Stroke Mother     Heart Disease Mother     Heart Conjunctiva/sclera: Conjunctivae normal.   Cardiovascular:      Rate and Rhythm: Normal rate and regular rhythm. Heart sounds: Normal heart sounds. Pulmonary:      Effort: Pulmonary effort is normal. No respiratory distress. Breath sounds: Normal breath sounds. Abdominal:      General: Abdomen is flat. Bowel sounds are normal.      Palpations: Abdomen is soft. There is no mass. Tenderness: There is abdominal tenderness in the epigastric area. There is no right CVA tenderness, left CVA tenderness, guarding or rebound. Musculoskeletal:      Right lower leg: No edema. Left lower leg: No edema. Skin:     General: Skin is warm and dry. Neurological:      Mental Status: She is alert and oriented to person, place, and time. Psychiatric:         Mood and Affect: Mood normal.         Behavior: Behavior normal.         Testing:     Orders Placed This Encounter   Procedures    External Referral To Gastroenterology     Referral Priority:   Routine     Referral Type:   Eval and Treat     Referral Reason:   Specialty Services Required     Requested Specialty:   Gastroenterology     Number of Visits Requested:   1        No results found for this or any previous visit (from the past 24 hour(s)).

## 2022-05-10 NOTE — PATIENT INSTRUCTIONS
Vince Darling M.D.   Address: [facility] [address] [place] [address]  Phone: [phone]

## 2022-05-16 ENCOUNTER — TELEPHONE (OUTPATIENT)
Dept: PRIMARY CARE CLINIC | Age: 52
End: 2022-05-16

## 2022-05-17 PROBLEM — K59.00 CONSTIPATION: Status: ACTIVE | Noted: 2022-05-17

## 2022-05-17 PROBLEM — K21.9 GASTROESOPHAGEAL REFLUX DISEASE: Status: ACTIVE | Noted: 2022-05-17

## 2022-05-17 PROBLEM — K64.9 HEMORRHOIDS: Status: ACTIVE | Noted: 2022-05-17

## 2022-05-17 NOTE — ASSESSMENT & PLAN NOTE
Possible allergies versus asthma, PFTs unremarkable  Was started on Flovent, discussed proper use and recommended to continue using prior to pulmonology follow-up  Continue albuterol as needed

## 2022-07-12 ENCOUNTER — OFFICE VISIT (OUTPATIENT)
Dept: PRIMARY CARE CLINIC | Age: 52
End: 2022-07-12
Payer: COMMERCIAL

## 2022-07-12 VITALS
HEART RATE: 88 BPM | HEIGHT: 64 IN | WEIGHT: 208 LBS | DIASTOLIC BLOOD PRESSURE: 72 MMHG | SYSTOLIC BLOOD PRESSURE: 110 MMHG | BODY MASS INDEX: 35.51 KG/M2 | OXYGEN SATURATION: 97 % | TEMPERATURE: 97.1 F

## 2022-07-12 DIAGNOSIS — K21.9 GASTROESOPHAGEAL REFLUX DISEASE, UNSPECIFIED WHETHER ESOPHAGITIS PRESENT: ICD-10-CM

## 2022-07-12 DIAGNOSIS — R63.1 POLYDIPSIA: ICD-10-CM

## 2022-07-12 DIAGNOSIS — R35.89 POLYURIA: ICD-10-CM

## 2022-07-12 DIAGNOSIS — K59.00 CONSTIPATION, UNSPECIFIED CONSTIPATION TYPE: Primary | ICD-10-CM

## 2022-07-12 DIAGNOSIS — K64.9 HEMORRHOIDS, UNSPECIFIED HEMORRHOID TYPE: ICD-10-CM

## 2022-07-12 DIAGNOSIS — E78.00 PURE HYPERCHOLESTEROLEMIA: ICD-10-CM

## 2022-07-12 DIAGNOSIS — Z91.09 ENVIRONMENTAL ALLERGIES: ICD-10-CM

## 2022-07-12 DIAGNOSIS — K59.00 CONSTIPATION, UNSPECIFIED CONSTIPATION TYPE: ICD-10-CM

## 2022-07-12 DIAGNOSIS — R06.09 DYSPNEA ON EXERTION: ICD-10-CM

## 2022-07-12 LAB
ALBUMIN SERPL-MCNC: 4 G/DL (ref 3.5–5.2)
ALP BLD-CCNC: 89 U/L (ref 35–104)
ALT SERPL-CCNC: 17 U/L (ref 0–32)
ANION GAP SERPL CALCULATED.3IONS-SCNC: 13 MMOL/L (ref 7–16)
AST SERPL-CCNC: 22 U/L (ref 0–31)
BILIRUB SERPL-MCNC: 0.4 MG/DL (ref 0–1.2)
BUN BLDV-MCNC: 14 MG/DL (ref 6–20)
CALCIUM SERPL-MCNC: 9 MG/DL (ref 8.6–10.2)
CHLORIDE BLD-SCNC: 105 MMOL/L (ref 98–107)
CHOLESTEROL, FASTING: 192 MG/DL (ref 0–199)
CO2: 23 MMOL/L (ref 22–29)
CREAT SERPL-MCNC: 1.3 MG/DL (ref 0.5–1)
GFR AFRICAN AMERICAN: 52
GFR NON-AFRICAN AMERICAN: 43 ML/MIN/1.73
GLUCOSE FASTING: 70 MG/DL (ref 74–99)
HBA1C MFR BLD: 4.7 % (ref 4–5.6)
HCT VFR BLD CALC: 42.2 % (ref 34–48)
HDLC SERPL-MCNC: 56 MG/DL
HEMOGLOBIN: 14 G/DL (ref 11.5–15.5)
LDL CHOLESTEROL CALCULATED: 119 MG/DL (ref 0–99)
MCH RBC QN AUTO: 29 PG (ref 26–35)
MCHC RBC AUTO-ENTMCNC: 33.2 % (ref 32–34.5)
MCV RBC AUTO: 87.6 FL (ref 80–99.9)
PDW BLD-RTO: 12.8 FL (ref 11.5–15)
PLATELET # BLD: 217 E9/L (ref 130–450)
PMV BLD AUTO: 10.6 FL (ref 7–12)
POTASSIUM SERPL-SCNC: 4.4 MMOL/L (ref 3.5–5)
RBC # BLD: 4.82 E12/L (ref 3.5–5.5)
SODIUM BLD-SCNC: 141 MMOL/L (ref 132–146)
TOTAL PROTEIN: 6.8 G/DL (ref 6.4–8.3)
TRIGLYCERIDE, FASTING: 83 MG/DL (ref 0–149)
TSH SERPL DL<=0.05 MIU/L-ACNC: 1.3 UIU/ML (ref 0.27–4.2)
VLDLC SERPL CALC-MCNC: 17 MG/DL
WBC # BLD: 5.1 E9/L (ref 4.5–11.5)

## 2022-07-12 PROCEDURE — 3017F COLORECTAL CA SCREEN DOC REV: CPT | Performed by: STUDENT IN AN ORGANIZED HEALTH CARE EDUCATION/TRAINING PROGRAM

## 2022-07-12 PROCEDURE — G8427 DOCREV CUR MEDS BY ELIG CLIN: HCPCS | Performed by: STUDENT IN AN ORGANIZED HEALTH CARE EDUCATION/TRAINING PROGRAM

## 2022-07-12 PROCEDURE — 1036F TOBACCO NON-USER: CPT | Performed by: STUDENT IN AN ORGANIZED HEALTH CARE EDUCATION/TRAINING PROGRAM

## 2022-07-12 PROCEDURE — G8417 CALC BMI ABV UP PARAM F/U: HCPCS | Performed by: STUDENT IN AN ORGANIZED HEALTH CARE EDUCATION/TRAINING PROGRAM

## 2022-07-12 PROCEDURE — 99214 OFFICE O/P EST MOD 30 MIN: CPT | Performed by: STUDENT IN AN ORGANIZED HEALTH CARE EDUCATION/TRAINING PROGRAM

## 2022-07-12 RX ORDER — FAMOTIDINE 40 MG/1
40 TABLET, FILM COATED ORAL EVERY EVENING
Qty: 30 TABLET | Refills: 2 | Status: SHIPPED
Start: 2022-07-12 | End: 2022-10-03

## 2022-07-12 RX ORDER — LISDEXAMFETAMINE DIMESYLATE 40 MG/1
CAPSULE ORAL
COMMUNITY
Start: 2022-06-28

## 2022-07-12 RX ORDER — LORATADINE 10 MG/1
10 TABLET ORAL DAILY
Qty: 30 TABLET | Refills: 2 | Status: SHIPPED
Start: 2022-07-12 | End: 2022-08-25 | Stop reason: SDUPTHER

## 2022-07-12 RX ORDER — DIAPER,BRIEF,INFANT-TODD,DISP
EACH MISCELLANEOUS
Qty: 30 G | Refills: 1 | Status: SHIPPED | OUTPATIENT
Start: 2022-07-12 | End: 2022-07-19

## 2022-07-12 RX ORDER — MOMETASONE FUROATE 220 UG/1
2 INHALANT RESPIRATORY (INHALATION) DAILY
Qty: 1 EACH | Refills: 2 | Status: SHIPPED
Start: 2022-07-12 | End: 2022-10-31 | Stop reason: ALTCHOICE

## 2022-07-12 SDOH — ECONOMIC STABILITY: FOOD INSECURITY: WITHIN THE PAST 12 MONTHS, YOU WORRIED THAT YOUR FOOD WOULD RUN OUT BEFORE YOU GOT MONEY TO BUY MORE.: NEVER TRUE

## 2022-07-12 SDOH — ECONOMIC STABILITY: FOOD INSECURITY: WITHIN THE PAST 12 MONTHS, THE FOOD YOU BOUGHT JUST DIDN'T LAST AND YOU DIDN'T HAVE MONEY TO GET MORE.: NEVER TRUE

## 2022-07-12 ASSESSMENT — ENCOUNTER SYMPTOMS
CONSTIPATION: 1
COUGH: 0
ANAL BLEEDING: 1
SHORTNESS OF BREATH: 1
NAUSEA: 1
VOMITING: 0
EYE DISCHARGE: 1
ABDOMINAL PAIN: 1
DIARRHEA: 0

## 2022-07-12 ASSESSMENT — PATIENT HEALTH QUESTIONNAIRE - PHQ9
6. FEELING BAD ABOUT YOURSELF - OR THAT YOU ARE A FAILURE OR HAVE LET YOURSELF OR YOUR FAMILY DOWN: 0
9. THOUGHTS THAT YOU WOULD BE BETTER OFF DEAD, OR OF HURTING YOURSELF: 0
1. LITTLE INTEREST OR PLEASURE IN DOING THINGS: 2
10. IF YOU CHECKED OFF ANY PROBLEMS, HOW DIFFICULT HAVE THESE PROBLEMS MADE IT FOR YOU TO DO YOUR WORK, TAKE CARE OF THINGS AT HOME, OR GET ALONG WITH OTHER PEOPLE: 1
2. FEELING DOWN, DEPRESSED OR HOPELESS: 1
SUM OF ALL RESPONSES TO PHQ9 QUESTIONS 1 & 2: 3
7. TROUBLE CONCENTRATING ON THINGS, SUCH AS READING THE NEWSPAPER OR WATCHING TELEVISION: 0
SUM OF ALL RESPONSES TO PHQ QUESTIONS 1-9: 6
5. POOR APPETITE OR OVEREATING: 1
4. FEELING TIRED OR HAVING LITTLE ENERGY: 1
3. TROUBLE FALLING OR STAYING ASLEEP: 0
SUM OF ALL RESPONSES TO PHQ QUESTIONS 1-9: 6
8. MOVING OR SPEAKING SO SLOWLY THAT OTHER PEOPLE COULD HAVE NOTICED. OR THE OPPOSITE, BEING SO FIGETY OR RESTLESS THAT YOU HAVE BEEN MOVING AROUND A LOT MORE THAN USUAL: 1
SUM OF ALL RESPONSES TO PHQ QUESTIONS 1-9: 6
SUM OF ALL RESPONSES TO PHQ QUESTIONS 1-9: 6

## 2022-07-12 ASSESSMENT — SOCIAL DETERMINANTS OF HEALTH (SDOH): HOW HARD IS IT FOR YOU TO PAY FOR THE VERY BASICS LIKE FOOD, HOUSING, MEDICAL CARE, AND HEATING?: NOT HARD AT ALL

## 2022-07-12 NOTE — PROGRESS NOTES
ESTABLISHED PRIMARY CARE VISIT    24  Name: Sri Siddiqui   : 1970   Age: 50 y.o.   Sex: female        Assessment & Plan:     Problem List Items Addressed This Visit        Circulatory    Hemorrhoids     Secondary to constipation, see plan  Trial topical hydrocortisone for hemorrhoids         Relevant Medications    hydrocortisone (ALA-DEEPTHI) 1 % cream    Other Relevant Orders    Amb External Referral To Gastroenterology       Digestive    Gastroesophageal reflux disease     Not improved  Continue omeprazole 40 mg daily  Start Pepcid 40 mg at night  Referred to another GI given wait times         Relevant Medications    famotidine (PEPCID) 40 MG tablet    magnesium citrate solution    Other Relevant Orders    Amb External Referral To Gastroenterology       Other    Constipation - Primary     Not improved with Miralax  Trial mag citrate then restart Miralax  Will call if not improved  Check labs  Referred to another GI given wait times         Relevant Medications    magnesium citrate solution    Other Relevant Orders    Amb External Referral To Gastroenterology    TSH    Comprehensive Metabolic Panel, Fasting    CBC    Pure hypercholesterolemia     Recheck fasting lipids         Relevant Orders    Lipid, Fasting    Environmental allergies     Trial Claritin         Relevant Medications    loratadine (CLARITIN) 10 MG tablet    Dyspnea     Possible allergies versus asthma, PFTs unremarkable  Following with pulmonology, needs to reschedule  Not using Flovent due to taste, trial Asmanex  Trial Claritin  Continue albuterol as needed         Relevant Medications    ASMANEX, 60 METERED DOSES, 220 MCG/INH inhaler    loratadine (CLARITIN) 10 MG tablet      Other Visit Diagnoses     Polydipsia        Relevant Orders    Hemoglobin A1C    Polyuria        Relevant Orders    Hemoglobin A1C          Counseled patient regarding above diagnosis, including possible risks and complications, especially if left uncontrolled. Counseled patient as appropriate and relevant regarding any possible side effects, risks, and alternatives to treatment; the patient verbalizes understanding, and is in agreement with the plan as detailed above. All educational materials and instructions were discussed and included on the After Visit Summary. All questions answered to the patient's satisfaction. The patient was advised to call or send [employer] message for any concerns prior to next appointment. Return in about 6 weeks (around 8/23/2022) for follow-up. This provider and patient were wearing surgical masks and practiced social distancing when appropriate during visit due to COVID-19 pandemic. Subjective:     Chief Complaint   Patient presents with    Other     2 month follow up for nausea and cough     Discuss Medications       Patient returns for follow up constipation and GERD  Referred to GI last visit, was rescheduled to Sept  Interested in new referral  Symptoms are not improved  Constipation not resolved with Miralax  Still going every 3-4 days  Hemorrhoids very painful, still bleeding    Also following up breathing  Has been using Flovent but doesn't like taste so does not use daily      Review of Systems   Constitutional: Negative for chills and fever. HENT: Positive for congestion. Eyes: Positive for discharge (watery). Negative for visual disturbance. Respiratory: Positive for shortness of breath. Negative for cough. Cardiovascular: Positive for palpitations. Negative for chest pain and leg swelling. Gastrointestinal: Positive for abdominal pain, anal bleeding (hemorrhoids), constipation and nausea. Negative for diarrhea and vomiting. Endocrine: Positive for polydipsia and polyuria. Genitourinary: Positive for menstrual problem (following with GYN, planning ablation). Negative for difficulty urinating and dysuria. Allergic/Immunologic: Positive for environmental allergies.         Medical History:     Patient Active Problem List   Diagnosis    Dyspnea    Attention deficit hyperactivity disorder (ADHD)    Menorrhagia with irregular cycle    Anxiety and depression    Insomnia    Migraine    Symptomatic PVCs    Gastroesophageal reflux disease    Constipation    Hemorrhoids        History reviewed. No pertinent past medical history.     Past Surgical History:   Procedure Laterality Date    TUBAL LIGATION      VENTRAL HERNIA REPAIR      WISDOM TOOTH EXTRACTION         Family History   Problem Relation Age of Onset    Stroke Mother     Heart Disease Mother     Heart Disease Father     Heart Surgery Father     Diabetes Sister     Thyroid Disease Sister     No Known Problems Daughter     No Known Problems Daughter     Depression Daughter     Schizophrenia Maternal Aunt        Medications:     Current Outpatient Medications:     VYVANSE 40 MG CAPS, take 1 capsule by mouth every morning, Disp: , Rfl:     traZODone (DESYREL) 150 MG tablet, 300 mg , Disp: , Rfl:     TRINTELLIX 10 MG TABS tablet, TAKE 1 TABLET BY MOUTH ONCE DAILY AT THE SAME TIME EACH DAY., Disp: , Rfl:     ASPIRIN 81 PO, Take 81 mg by mouth daily Take one tablet by mouth once a day, Disp: , Rfl:     omeprazole (PRILOSEC) 40 MG delayed release capsule, Take 1 capsule by mouth every morning (before breakfast), Disp: 30 capsule, Rfl: 5    albuterol sulfate  (90 Base) MCG/ACT inhaler, inhale 2 puffs by mouth and INTO THE LUNGS four times a day if needed for shortness of breath, Disp: , Rfl:     VYVANSE 30 MG capsule, take 1 capsule by mouth every morning (Patient not taking: Reported on 7/12/2022), Disp: , Rfl:     polyethylene glycol (GLYCOLAX) 17 GM/SCOOP powder, Take 17 g by mouth daily as needed (constipation) (Patient not taking: Reported on 7/12/2022), Disp: 510 g, Rfl: 5    FLOVENT  MCG/ACT inhaler, inhale 2 puffs by mouth and INTO THE LUNGS twice a day (Patient not taking: Reported on 5/10/2022), Disp: , Rfl:     medroxyPROGESTERone (PROVERA) 10 MG tablet, take 1 tablet by mouth once daily (Patient not taking: Reported on 2022), Disp: , Rfl:     Allergies: Allergies   Allergen Reactions    Latex     Adderall [Amphetamine-Dextroamphetamine]     Nortriptyline     Propranolol     Topiramate        Social History:     Social History     Socioeconomic History    Marital status:      Spouse name: Not on file    Number of children: Not on file    Years of education: Not on file    Highest education level: Not on file   Occupational History    Not on file   Tobacco Use    Smoking status: Former Smoker     Quit date:      Years since quittin.5    Smokeless tobacco: Never Used   Substance and Sexual Activity    Alcohol use: Not Currently    Drug use: Never    Sexual activity: Yes     Partners: Male     Comment:    Other Topics Concern    Not on file   Social History Narrative    Not on file     Social Determinants of Health     Financial Resource Strain: Low Risk     Difficulty of Paying Living Expenses: Not hard at all   Food Insecurity: No Food Insecurity    Worried About [address] in the Last Year: Never true    [address] in the Last Year: Never true   Transportation Needs:     Lack of Transportation (Medical): Not on file    Lack of Transportation (Non-Medical):  Not on file   Physical Activity:     Days of Exercise per Week: Not on file    Minutes of Exercise per Session: Not on file   Stress:     Feeling of Stress : Not on file   Social Connections:     Frequency of Communication with Friends and Family: Not on file    Frequency of Social Gatherings with Friends and Family: Not on file    Attends Judaism Services: Not on file    Active Member of Clubs or Organizations: Not on file    Attends Club or Organization Meetings: Not on file    Marital Status: Not on file   Intimate Partner Violence:     Fear of Current or Ex-Partner: Not on file    Emotionally Abused: Not on file    Physically Abused: Not on file    Sexually Abused: Not on file   Housing Stability:     Unable to Pay for Housing in the Last Year: Not on file    Number of Places Lived in the Last Year: Not on file    Unstable Housing in the Last Year: Not on file       Physical Exam:     Vitals:    07/12/22 0856   BP: 110/72   Site: Left Upper Arm   Position: Sitting   Pulse: 88   Temp: 97.1 °F (36.2 °C)   SpO2: 97%   Weight: 208 lb (94.3 kg)   Height: 5' 4\" (1.626 m)       BP Readings from Last 3 Encounters:   07/12/22 110/72   05/10/22 115/86   11/04/21 112/82       Wt Readings from Last 3 Encounters:   07/12/22 208 lb (94.3 kg)   05/10/22 205 lb 12.8 oz (93.4 kg)   11/04/21 225 lb (102.1 kg)       Physical Exam  Vitals and nursing note reviewed. Constitutional:       General: She is not in acute distress. Appearance: Normal appearance. She is obese. She is not ill-appearing or diaphoretic. Eyes:      Extraocular Movements: Extraocular movements intact. Conjunctiva/sclera: Conjunctivae normal.   Cardiovascular:      Rate and Rhythm: Normal rate and regular rhythm. Heart sounds: Normal heart sounds. Pulmonary:      Effort: Pulmonary effort is normal. No respiratory distress. Breath sounds: Normal breath sounds. Abdominal:      General: Abdomen is flat. Bowel sounds are normal. There is no distension. Palpations: Abdomen is soft. There is no mass. Tenderness: There is abdominal tenderness in the epigastric area, left upper quadrant and left lower quadrant. There is no right CVA tenderness, left CVA tenderness, guarding or rebound. Musculoskeletal:      Right lower leg: No edema. Left lower leg: No edema. Skin:     General: Skin is warm and dry. Neurological:      Mental Status: She is alert and oriented to person, place, and time.    Psychiatric:         Mood and Affect: Mood normal.         Behavior: Behavior normal. Testing:     Orders Placed This Encounter   Procedures    Hemoglobin A1C     Standing Status:   Future     Number of Occurrences:   1     Standing Expiration Date:   7/12/2023    Lipid, Fasting     Standing Status:   Future     Number of Occurrences:   1     Standing Expiration Date:   7/12/2023    TSH     Standing Status:   Future     Number of Occurrences:   1     Standing Expiration Date:   7/12/2023    Comprehensive Metabolic Panel, Fasting     Standing Status:   Future     Number of Occurrences:   1     Standing Expiration Date:   7/12/2023    CBC     Standing Status:   Future     Number of Occurrences:   1     Standing Expiration Date:   7/12/2023    Amb External Referral To Gastroenterology     Referral Priority:   Routine     Referral Type:   Consult for Advice and Opinion     Referral Reason:   Specialty Services Required     Referred to Provider:   Alison Parisi MD     Requested Specialty:   Gastroenterology     Number of Visits Requested:   1        No results found for this or any previous visit (from the past 24 hour(s)).

## 2022-07-12 NOTE — ASSESSMENT & PLAN NOTE
Not improved with Miralax  Trial mag citrate then restart Miralax  Will call if not improved  Check labs  Referred to another GI given wait times

## 2022-07-12 NOTE — ASSESSMENT & PLAN NOTE
Not improved  Continue omeprazole 40 mg daily  Start Pepcid 40 mg at night  Referred to another GI given wait times

## 2022-07-21 LAB
ALBUMIN SERPL-MCNC: 4.2 G/DL (ref 3.5–5.2)
ALP BLD-CCNC: 92 U/L (ref 35–104)
ALT SERPL-CCNC: 16 U/L (ref 0–32)
ANION GAP SERPL CALCULATED.3IONS-SCNC: 10 MMOL/L (ref 7–16)
AST SERPL-CCNC: 20 U/L (ref 0–31)
BILIRUB SERPL-MCNC: 0.3 MG/DL (ref 0–1.2)
BUN BLDV-MCNC: 16 MG/DL (ref 6–20)
CALCIUM SERPL-MCNC: 8.9 MG/DL (ref 8.6–10.2)
CHLORIDE BLD-SCNC: 106 MMOL/L (ref 98–107)
CHOLESTEROL, TOTAL: 208 MG/DL (ref 0–199)
CO2: 22 MMOL/L (ref 22–29)
CREAT SERPL-MCNC: 1.1 MG/DL (ref 0.5–1)
GFR AFRICAN AMERICAN: >60
GFR NON-AFRICAN AMERICAN: 52 ML/MIN/1.73
GLUCOSE BLD-MCNC: 72 MG/DL (ref 74–99)
HDLC SERPL-MCNC: 65 MG/DL
LDL CHOLESTEROL CALCULATED: 127 MG/DL (ref 0–99)
POTASSIUM SERPL-SCNC: 4.8 MMOL/L (ref 3.5–5)
SODIUM BLD-SCNC: 138 MMOL/L (ref 132–146)
TOTAL PROTEIN: 7.1 G/DL (ref 6.4–8.3)
TRIGL SERPL-MCNC: 82 MG/DL (ref 0–149)
TSH SERPL DL<=0.05 MIU/L-ACNC: 1.21 UIU/ML (ref 0.27–4.2)
VLDLC SERPL CALC-MCNC: 16 MG/DL

## 2022-08-25 ENCOUNTER — OFFICE VISIT (OUTPATIENT)
Dept: PRIMARY CARE CLINIC | Age: 52
End: 2022-08-25
Payer: COMMERCIAL

## 2022-08-25 VITALS
HEIGHT: 64 IN | HEART RATE: 57 BPM | SYSTOLIC BLOOD PRESSURE: 116 MMHG | WEIGHT: 205.4 LBS | BODY MASS INDEX: 35.07 KG/M2 | OXYGEN SATURATION: 96 % | DIASTOLIC BLOOD PRESSURE: 70 MMHG | TEMPERATURE: 97.6 F

## 2022-08-25 DIAGNOSIS — K59.00 CONSTIPATION, UNSPECIFIED CONSTIPATION TYPE: Primary | ICD-10-CM

## 2022-08-25 DIAGNOSIS — R35.89 POLYURIA: ICD-10-CM

## 2022-08-25 DIAGNOSIS — R06.09 DYSPNEA ON EXERTION: ICD-10-CM

## 2022-08-25 DIAGNOSIS — I49.3 SYMPTOMATIC PVCS: ICD-10-CM

## 2022-08-25 DIAGNOSIS — K21.9 GASTROESOPHAGEAL REFLUX DISEASE, UNSPECIFIED WHETHER ESOPHAGITIS PRESENT: ICD-10-CM

## 2022-08-25 DIAGNOSIS — B37.0 ORAL THRUSH: ICD-10-CM

## 2022-08-25 DIAGNOSIS — Z91.09 ENVIRONMENTAL ALLERGIES: ICD-10-CM

## 2022-08-25 LAB
BILIRUBIN, POC: NEGATIVE
BLOOD URINE, POC: NEGATIVE
CLARITY, POC: NORMAL
COLOR, POC: NORMAL
GLUCOSE URINE, POC: NEGATIVE
KETONES, POC: NEGATIVE
LEUKOCYTE EST, POC: NEGATIVE
NITRITE, POC: NEGATIVE
PH, POC: 5.5
PROTEIN, POC: NEGATIVE
SPECIFIC GRAVITY, POC: 1.02
UROBILINOGEN, POC: 0.2

## 2022-08-25 PROCEDURE — 99214 OFFICE O/P EST MOD 30 MIN: CPT | Performed by: STUDENT IN AN ORGANIZED HEALTH CARE EDUCATION/TRAINING PROGRAM

## 2022-08-25 PROCEDURE — 81002 URINALYSIS NONAUTO W/O SCOPE: CPT | Performed by: STUDENT IN AN ORGANIZED HEALTH CARE EDUCATION/TRAINING PROGRAM

## 2022-08-25 PROCEDURE — G8417 CALC BMI ABV UP PARAM F/U: HCPCS | Performed by: STUDENT IN AN ORGANIZED HEALTH CARE EDUCATION/TRAINING PROGRAM

## 2022-08-25 PROCEDURE — 3017F COLORECTAL CA SCREEN DOC REV: CPT | Performed by: STUDENT IN AN ORGANIZED HEALTH CARE EDUCATION/TRAINING PROGRAM

## 2022-08-25 PROCEDURE — G8427 DOCREV CUR MEDS BY ELIG CLIN: HCPCS | Performed by: STUDENT IN AN ORGANIZED HEALTH CARE EDUCATION/TRAINING PROGRAM

## 2022-08-25 PROCEDURE — 1036F TOBACCO NON-USER: CPT | Performed by: STUDENT IN AN ORGANIZED HEALTH CARE EDUCATION/TRAINING PROGRAM

## 2022-08-25 RX ORDER — LORATADINE 10 MG/1
10 TABLET ORAL DAILY
Qty: 30 TABLET | Refills: 5 | Status: SHIPPED | OUTPATIENT
Start: 2022-08-25

## 2022-08-25 RX ORDER — ECHINACEA PURPUREA EXTRACT 125 MG
1 TABLET ORAL PRN
Qty: 1 EACH | Refills: 5 | Status: SHIPPED | OUTPATIENT
Start: 2022-08-25

## 2022-08-25 RX ORDER — FLUOXETINE HYDROCHLORIDE 20 MG/1
CAPSULE ORAL
COMMUNITY
Start: 2022-08-08

## 2022-08-25 ASSESSMENT — ENCOUNTER SYMPTOMS
COUGH: 0
ABDOMINAL DISTENTION: 1
ANAL BLEEDING: 0
NAUSEA: 1
ABDOMINAL PAIN: 1
VOMITING: 0
DIARRHEA: 0
CONSTIPATION: 1
SHORTNESS OF BREATH: 1

## 2022-08-25 NOTE — ASSESSMENT & PLAN NOTE
Polyuria may be due to polydipsia/dry mouth  However also with urgency  UA unremarkable  Trial Kegel/pelvic floor exercises  If no improvement, consider pelvic floor PT, bladder ultrasound, urology referral

## 2022-08-25 NOTE — ASSESSMENT & PLAN NOTE
Improved following short course of Linzess, ran out of sample  Following with GI  Continue MiraLAX daily  Colonoscopy and EGD scheduled next month

## 2022-08-25 NOTE — ASSESSMENT & PLAN NOTE
Possible allergies versus asthma, PFTs unremarkable  Following with pulmonology  Continue Asmanex, counseled on proper use and rinsing  Continue Claritin, albuterol as needed

## 2022-08-25 NOTE — ASSESSMENT & PLAN NOTE
Uncontrolled  Restart Claritin, several refills sent  Avoid nasal steroid due to current thrush  Trial nasal saline

## 2022-08-25 NOTE — PROGRESS NOTES
ESTABLISHED PRIMARY CARE VISIT    22  Name: Tash Peterson   : 1970   Age: 50 y.o. Sex: female        Assessment & Plan:     Problem List Items Addressed This Visit          Circulatory    Symptomatic PVCs     Work-up with cardiology ongoing         Relevant Medications    metoprolol tartrate (LOPRESSOR) 25 MG tablet       Digestive    Gastroesophageal reflux disease     Improved  Following with GI  Continue omeprazole 40 mg daily, Pepcid 40 mg nightly  EGD scheduled            Other    Constipation - Primary     Improved following short course of Linzess, ran out of sample  Following with GI  Continue MiraLAX daily  Colonoscopy and EGD scheduled next month         Environmental allergies     Uncontrolled  Restart Claritin, several refills sent  Avoid nasal steroid due to current thrush  Trial nasal saline         Relevant Medications    loratadine (CLARITIN) 10 MG tablet    sodium chloride (ALTAMIST SPRAY) 0.65 % nasal spray    Polyuria     Polyuria may be due to polydipsia/dry mouth  However also with urgency  UA unremarkable  Trial Kegel/pelvic floor exercises  If no improvement, consider pelvic floor PT, bladder ultrasound, urology referral         Relevant Orders    POCT Urinalysis no Micro (Completed)    Dyspnea     Possible allergies versus asthma, PFTs unremarkable  Following with pulmonology  Continue Asmanex, counseled on proper use and rinsing  Continue Claritin, albuterol as needed         Relevant Medications    loratadine (CLARITIN) 10 MG tablet     Other Visit Diagnoses       Oral thrush        Suspect related to dry mouth due to uncontrolled GERD given has not recently used Asmanex  Trial nystatin mouthwash    Relevant Medications    nystatin (MYCOSTATIN) 431225 UNIT/ML suspension            Counseled patient regarding above diagnosis, including possible risks and complications, especially if left uncontrolled.   Counseled patient as appropriate and relevant regarding any possible side effects, risks, and alternatives to treatment; the patient verbalizes understanding, and is in agreement with the plan as detailed above. All educational materials and instructions were discussed and included on the After Visit Summary. All questions answered to the patient's satisfaction. The patient was advised to call or send [employer] message for any concerns prior to next appointment. Return in about 2 months (around 10/25/2022) for follow-up. 32 minutes was spent precharting, face-to-face with patient, and documenting on day of encounter. This provider and patient were wearing surgical masks and practiced social distancing when appropriate during visit due to COVID-19 pandemic. Subjective:     Chief Complaint   Patient presents with    Other     Going over kidney and bladder        Patient returns for follow-up GI issues, urinary issues, allergies    Saw GI  Was given Linzess which resolved her constipation  Not taking Miralax right now  Constipation improved but still sometimes  Drinking more water  Left sided pain and bloating remains  Planned colonoscopy and EGD next month    Ears feel full  Ran out of Claritin    Still peeing a lot but notes she drinks a lot due to dry mouth  Has to go urgently when she does have to go  Also pees when she sneezes, coughs, laughs      Review of Systems   Constitutional:  Negative for chills and fever. HENT:  Positive for congestion, ear pain and mouth sores. Respiratory:  Positive for shortness of breath. Negative for cough. Cardiovascular:  Positive for palpitations. Negative for chest pain and leg swelling. Gastrointestinal:  Positive for abdominal distention, abdominal pain, constipation (improved) and nausea. Negative for anal bleeding, diarrhea and vomiting. Endocrine: Positive for polydipsia and polyuria. Genitourinary:  Positive for frequency and urgency. Negative for difficulty urinating and dysuria.         Leakage with abdominal pressure. Allergic/Immunologic: Positive for environmental allergies. Medical History:     Patient Active Problem List   Diagnosis    Dyspnea    Attention deficit hyperactivity disorder (ADHD)    Menorrhagia with irregular cycle    Anxiety and depression    Insomnia    Migraine    Symptomatic PVCs    Gastroesophageal reflux disease    Constipation    Hemorrhoids    Pure hypercholesterolemia    Environmental allergies        No past medical history on file.     Past Surgical History:   Procedure Laterality Date    TUBAL LIGATION      VENTRAL HERNIA REPAIR      WISDOM TOOTH EXTRACTION         Family History   Problem Relation Age of Onset    Stroke Mother     Heart Disease Mother     Heart Disease Father     Heart Surgery Father     Diabetes Sister     Thyroid Disease Sister     No Known Problems Daughter     No Known Problems Daughter     Depression Daughter     Schizophrenia Maternal Aunt        Medications:     Current Outpatient Medications:     FLUoxetine (PROZAC) 20 MG capsule, take 1 capsule by mouth once daily, Disp: , Rfl:     metoprolol tartrate (LOPRESSOR) 25 MG tablet, take 1/2 tablet by mouth twice a day, Disp: , Rfl:     VYVANSE 40 MG CAPS, take 1 capsule by mouth every morning, Disp: , Rfl:     famotidine (PEPCID) 40 MG tablet, Take 1 tablet by mouth every evening, Disp: 30 tablet, Rfl: 2    ASMANEX, 60 METERED DOSES, 220 MCG/INH inhaler, Inhale 2 puffs into the lungs daily, Disp: 1 each, Rfl: 2    loratadine (CLARITIN) 10 MG tablet, Take 1 tablet by mouth daily, Disp: 30 tablet, Rfl: 2    traZODone (DESYREL) 150 MG tablet, 300 mg , Disp: , Rfl:     ASPIRIN 81 PO, Take 81 mg by mouth daily Take one tablet by mouth once a day, Disp: , Rfl:     omeprazole (PRILOSEC) 40 MG delayed release capsule, Take 1 capsule by mouth every morning (before breakfast), Disp: 30 capsule, Rfl: 5    albuterol sulfate  (90 Base) MCG/ACT inhaler, inhale 2 puffs by mouth and INTO THE LUNGS four times a day if needed for shortness of breath, Disp: , Rfl:     norethindrone (AYGESTIN) 5 MG tablet, TAKE 1 TABLET BY MOUTH DAILY DURING SECOND HALF OF MENSTRUAL CYCLE. (Patient not taking: Reported on 2022), Disp: , Rfl:     magnesium citrate solution, Take 296 mLs by mouth once for 1 dose (Patient not taking: Reported on 2022), Disp: 296 mL, Rfl: 0    TRINTELLIX 10 MG TABS tablet, TAKE 1 TABLET BY MOUTH ONCE DAILY AT THE SAME TIME EACH DAY. (Patient not taking: Reported on 2022), Disp: , Rfl:     polyethylene glycol (GLYCOLAX) 17 GM/SCOOP powder, Take 17 g by mouth daily as needed (constipation) (Patient not taking: No sig reported), Disp: 510 g, Rfl: 5    Allergies:      Allergies   Allergen Reactions    Latex     Adderall [Amphetamine-Dextroamphetamine]     Nortriptyline     Propranolol     Topiramate        Social History:     Social History     Socioeconomic History    Marital status:      Spouse name: Not on file    Number of children: Not on file    Years of education: Not on file    Highest education level: Not on file   Occupational History    Not on file   Tobacco Use    Smoking status: Former     Types: Cigarettes     Quit date:      Years since quittin.6    Smokeless tobacco: Never   Substance and Sexual Activity    Alcohol use: Not Currently    Drug use: Never    Sexual activity: Yes     Partners: Male     Comment:    Other Topics Concern    Not on file   Social History Narrative    Not on file     Social Determinants of Health     Financial Resource Strain: Low Risk     Difficulty of Paying Living Expenses: Not hard at all   Food Insecurity: No Food Insecurity    Worried About Running Out of Food in the Last Year: Never true    Ran Out of Food in the Last Year: Never true   Transportation Needs: Not on file   Physical Activity: Not on file   Stress: Not on file   Social Connections: Not on file   Intimate Partner Violence: Not on file   Housing Stability: Not on file Physical Exam:     Vitals:    08/25/22 0938   BP: 116/70   Site: Left Upper Arm   Position: Sitting   Pulse: 57   Temp: 97.6 °F (36.4 °C)   SpO2: 96%   Weight: 205 lb 6.4 oz (93.2 kg)   Height: 5' 4\" (1.626 m)       BP Readings from Last 3 Encounters:   08/25/22 116/70   07/12/22 110/72   05/10/22 115/86       Wt Readings from Last 3 Encounters:   08/25/22 205 lb 6.4 oz (93.2 kg)   07/12/22 208 lb (94.3 kg)   05/10/22 205 lb 12.8 oz (93.4 kg)       Physical Exam  Vitals and nursing note reviewed. Constitutional:       General: She is not in acute distress. Appearance: Normal appearance. She is obese. She is not ill-appearing or diaphoretic. HENT:      Right Ear: Tympanic membrane, ear canal and external ear normal.      Left Ear: Tympanic membrane, ear canal and external ear normal.      Mouth/Throat:      Mouth: Mucous membranes are dry. Comments: Tongue tender with white film. No erythema or crusting. Eyes:      Extraocular Movements: Extraocular movements intact. Conjunctiva/sclera: Conjunctivae normal.   Cardiovascular:      Rate and Rhythm: Normal rate and regular rhythm. Heart sounds: Normal heart sounds. Pulmonary:      Effort: Pulmonary effort is normal. No respiratory distress. Breath sounds: Normal breath sounds. Skin:     General: Skin is warm and dry. Neurological:      Mental Status: She is alert and oriented to person, place, and time.        Testing:     Orders Placed This Encounter   Procedures    POCT Urinalysis no Micro       Recent Results (from the past 24 hour(s))   POCT Urinalysis no Micro    Collection Time: 08/25/22 10:27 AM   Result Value Ref Range    Color, UA monica     Clarity, UA      Glucose, UA POC negative     Bilirubin, UA negative     Ketones, UA negative     Spec Grav, UA 1.025     Blood, UA POC negative     pH, UA 5.5     Protein, UA POC negative     Urobilinogen, UA 0.2     Leukocytes, UA negative     Nitrite, UA negative

## 2022-09-12 LAB — HCG QUALITATIVE: NEGATIVE

## 2022-10-03 DIAGNOSIS — K21.9 GASTROESOPHAGEAL REFLUX DISEASE, UNSPECIFIED WHETHER ESOPHAGITIS PRESENT: ICD-10-CM

## 2022-10-03 RX ORDER — FAMOTIDINE 40 MG/1
40 TABLET, FILM COATED ORAL EVERY EVENING
Qty: 30 TABLET | Refills: 2 | Status: SHIPPED | OUTPATIENT
Start: 2022-10-03

## 2022-10-23 DIAGNOSIS — K21.9 GASTROESOPHAGEAL REFLUX DISEASE, UNSPECIFIED WHETHER ESOPHAGITIS PRESENT: ICD-10-CM

## 2022-10-24 LAB — MAMMOGRAPHY, EXTERNAL: NORMAL

## 2022-10-24 RX ORDER — OMEPRAZOLE 40 MG/1
CAPSULE, DELAYED RELEASE ORAL
Qty: 30 CAPSULE | Refills: 5 | Status: SHIPPED | OUTPATIENT
Start: 2022-10-24

## 2022-10-25 ENCOUNTER — OFFICE VISIT (OUTPATIENT)
Dept: PRIMARY CARE CLINIC | Age: 52
End: 2022-10-25
Payer: COMMERCIAL

## 2022-10-25 VITALS
TEMPERATURE: 97.5 F | SYSTOLIC BLOOD PRESSURE: 118 MMHG | HEIGHT: 64 IN | OXYGEN SATURATION: 98 % | DIASTOLIC BLOOD PRESSURE: 72 MMHG | WEIGHT: 214.2 LBS | BODY MASS INDEX: 36.57 KG/M2 | HEART RATE: 62 BPM

## 2022-10-25 DIAGNOSIS — K58.1 IRRITABLE BOWEL SYNDROME WITH CONSTIPATION: Primary | ICD-10-CM

## 2022-10-25 DIAGNOSIS — K64.8 INTERNAL HEMORRHOID: ICD-10-CM

## 2022-10-25 DIAGNOSIS — K20.80 LOS ANGELES GRADE B ESOPHAGITIS: ICD-10-CM

## 2022-10-25 DIAGNOSIS — R06.09 DYSPNEA ON EXERTION: ICD-10-CM

## 2022-10-25 PROCEDURE — G8427 DOCREV CUR MEDS BY ELIG CLIN: HCPCS | Performed by: STUDENT IN AN ORGANIZED HEALTH CARE EDUCATION/TRAINING PROGRAM

## 2022-10-25 PROCEDURE — 99213 OFFICE O/P EST LOW 20 MIN: CPT | Performed by: STUDENT IN AN ORGANIZED HEALTH CARE EDUCATION/TRAINING PROGRAM

## 2022-10-25 PROCEDURE — 1036F TOBACCO NON-USER: CPT | Performed by: STUDENT IN AN ORGANIZED HEALTH CARE EDUCATION/TRAINING PROGRAM

## 2022-10-25 PROCEDURE — 3017F COLORECTAL CA SCREEN DOC REV: CPT | Performed by: STUDENT IN AN ORGANIZED HEALTH CARE EDUCATION/TRAINING PROGRAM

## 2022-10-25 PROCEDURE — G8417 CALC BMI ABV UP PARAM F/U: HCPCS | Performed by: STUDENT IN AN ORGANIZED HEALTH CARE EDUCATION/TRAINING PROGRAM

## 2022-10-25 PROCEDURE — G8484 FLU IMMUNIZE NO ADMIN: HCPCS | Performed by: STUDENT IN AN ORGANIZED HEALTH CARE EDUCATION/TRAINING PROGRAM

## 2022-10-25 RX ORDER — ONDANSETRON 4 MG/1
TABLET, ORALLY DISINTEGRATING ORAL
COMMUNITY
Start: 2022-10-17

## 2022-10-25 RX ORDER — DIAPER,BRIEF,INFANT-TODD,DISP
EACH MISCELLANEOUS
COMMUNITY

## 2022-10-25 RX ORDER — LINACLOTIDE 145 UG/1
CAPSULE, GELATIN COATED ORAL
COMMUNITY
Start: 2022-10-17

## 2022-10-25 ASSESSMENT — ENCOUNTER SYMPTOMS
VOMITING: 0
ANAL BLEEDING: 0
SHORTNESS OF BREATH: 1
NAUSEA: 1
CONSTIPATION: 1
DIARRHEA: 0
COUGH: 0
ABDOMINAL DISTENTION: 1
ABDOMINAL PAIN: 1

## 2022-10-25 NOTE — PROGRESS NOTES
ESTABLISHED PRIMARY CARE VISIT    10/25/22  Name: Leanne Ford   : 1970   Age: 50 y.o. Sex: female        Assessment & Plan:     Problem List Items Addressed This Visit          Circulatory    Internal hemorrhoid     Continue hydrocortisone as needed            Digestive    Irritable bowel syndrome with constipation - Primary     Following with GI  Continue Linzess  Counseled on low FODMAP diet         Relevant Medications    ondansetron (ZOFRAN-ODT) 4 MG disintegrating tablet    LINZESS 145 MCG capsule    Johnson grade B esophagitis     Confirmed on EGD  Continue omeprazole 40 mg daily, Pepcid 40 mg nightly            Other    Dyspnea     Patient stopped inhalers due to preference            Counseled patient regarding above diagnosis, including possible risks and complications, especially if left uncontrolled. Counseled patient as appropriate and relevant regarding any possible side effects, risks, and alternatives to treatment; the patient verbalizes understanding, and is in agreement with the plan as detailed above. All educational materials and instructions were discussed and included on the After Visit Summary. All questions answered to the patient's satisfaction. The patient was advised to call or send invi message for any concerns prior to next appointment. Return in about 3 months (around 2023). 24 minutes was spent precharting, face-to-face with patient, and documenting on day of encounter. Subjective:     Chief Complaint   Patient presents with    Results     Go over colonoscopy and upper gi endo        Patient returns for follow-up  Had EGD and colonoscopy  Was told she has hemorrhoids and IBS  Was started on Linzess    Breathing improved  Stopped inhalers    Review of Systems   Constitutional:  Negative for chills and fever. HENT:  Negative for congestion and postnasal drip. Respiratory:  Positive for shortness of breath (improved). Negative for cough. Cardiovascular:  Positive for palpitations (improved). Negative for chest pain and leg swelling. Gastrointestinal:  Positive for abdominal distention, abdominal pain, constipation and nausea. Negative for anal bleeding, diarrhea and vomiting. Endocrine: Positive for polydipsia and polyuria. Genitourinary:  Positive for frequency and urgency. Negative for difficulty urinating and dysuria. Leakage with abdominal pressure. Allergic/Immunologic: Positive for environmental allergies. Medical History:     Patient Active Problem List   Diagnosis    Dyspnea    Attention deficit hyperactivity disorder (ADHD)    Menorrhagia with irregular cycle    Anxiety and depression    Insomnia    Migraine    Symptomatic PVCs    Gastroesophageal reflux disease    Constipation    Hemorrhoids    Pure hypercholesterolemia    Environmental allergies    Polyuria        No past medical history on file.     Past Surgical History:   Procedure Laterality Date    TUBAL LIGATION      VENTRAL HERNIA REPAIR      WISDOM TOOTH EXTRACTION         Family History   Problem Relation Age of Onset    Stroke Mother     Heart Disease Mother     Heart Disease Father     Heart Surgery Father     Diabetes Sister     Thyroid Disease Sister     No Known Problems Daughter     No Known Problems Daughter     Depression Daughter     Schizophrenia Maternal Aunt        Medications:     Current Outpatient Medications:     ondansetron (ZOFRAN-ODT) 4 MG disintegrating tablet, , Disp: , Rfl:     LINZESS 145 MCG capsule, , Disp: , Rfl:     hydrocortisone 1 % cream, hydrocortisone 1 % topical cream  apply to affected area twice a day for up to 2 weeks, Disp: , Rfl:     omeprazole (PRILOSEC) 40 MG delayed release capsule, take 1 capsule by mouth EVERY MORNING BEFORE BREAKFAST, Disp: 30 capsule, Rfl: 5    famotidine (PEPCID) 40 MG tablet, take 1 tablet by mouth every evening, Disp: 30 tablet, Rfl: 2    FLUoxetine (PROZAC) 20 MG capsule, take 1 capsule by mouth once daily, Disp: , Rfl:     metoprolol tartrate (LOPRESSOR) 25 MG tablet, take 1/2 tablet by mouth twice a day, Disp: , Rfl:     loratadine (CLARITIN) 10 MG tablet, Take 1 tablet by mouth daily, Disp: 30 tablet, Rfl: 5    sodium chloride (ALTAMIST SPRAY) 0.65 % nasal spray, 1 spray by Nasal route as needed for Congestion, Disp: 1 each, Rfl: 5    VYVANSE 40 MG CAPS, take 1 capsule by mouth every morning, Disp: , Rfl:     traZODone (DESYREL) 150 MG tablet, 300 mg , Disp: , Rfl:     ASPIRIN 81 PO, Take 81 mg by mouth daily Take one tablet by mouth once a day, Disp: , Rfl:     polyethylene glycol (GLYCOLAX) 17 GM/SCOOP powder, Take 17 g by mouth daily as needed (constipation), Disp: 510 g, Rfl: 5    ASMANEX, 60 METERED DOSES, 220 MCG/INH inhaler, Inhale 2 puffs into the lungs daily (Patient not taking: Reported on 10/25/2022), Disp: 1 each, Rfl: 2    albuterol sulfate  (90 Base) MCG/ACT inhaler, inhale 2 puffs by mouth and INTO THE LUNGS four times a day if needed for shortness of breath (Patient not taking: Reported on 10/25/2022), Disp: , Rfl:     Allergies:      Allergies   Allergen Reactions    Latex     Adderall [Amphetamine-Dextroamphetamine]     Nortriptyline     Propranolol     Topiramate        Social History:     Social History     Socioeconomic History    Marital status:      Spouse name: Not on file    Number of children: Not on file    Years of education: Not on file    Highest education level: Not on file   Occupational History    Not on file   Tobacco Use    Smoking status: Former     Types: Cigarettes     Quit date:      Years since quittin.8    Smokeless tobacco: Never   Substance and Sexual Activity    Alcohol use: Not Currently    Drug use: Never    Sexual activity: Yes     Partners: Male     Comment:    Other Topics Concern    Not on file   Social History Narrative    Not on file     Social Determinants of Health     Financial Resource Strain: Low Risk Difficulty of Paying Living Expenses: Not hard at all   Food Insecurity: No Food Insecurity    Worried About Running Out of Food in the Last Year: Never true    Ran Out of Food in the Last Year: Never true   Transportation Needs: Not on file   Physical Activity: Not on file   Stress: Not on file   Social Connections: Not on file   Intimate Partner Violence: Not on file   Housing Stability: Not on file       Physical Exam:     Vitals:    10/25/22 1532   BP: 118/72   Site: Left Upper Arm   Position: Sitting   Cuff Size: Large Adult   Pulse: 62   Temp: 97.5 °F (36.4 °C)   SpO2: 98%   Weight: 214 lb 3.2 oz (97.2 kg)   Height: 5' 4\" (1.626 m)       BP Readings from Last 3 Encounters:   10/25/22 118/72   08/25/22 116/70   07/12/22 110/72       Wt Readings from Last 3 Encounters:   10/25/22 214 lb 3.2 oz (97.2 kg)   08/25/22 205 lb 6.4 oz (93.2 kg)   07/12/22 208 lb (94.3 kg)       Physical Exam  Vitals and nursing note reviewed. Constitutional:       General: She is not in acute distress. Appearance: Normal appearance. She is obese. She is not ill-appearing or diaphoretic. Cardiovascular:      Rate and Rhythm: Normal rate and regular rhythm. Heart sounds: Normal heart sounds. Pulmonary:      Effort: Pulmonary effort is normal. No respiratory distress. Breath sounds: Normal breath sounds. Skin:     General: Skin is warm and dry. Neurological:      Mental Status: She is alert and oriented to person, place, and time. Testing:   No orders of the defined types were placed in this encounter. No results found for this or any previous visit (from the past 24 hour(s)).

## 2022-10-31 PROBLEM — K58.1 IRRITABLE BOWEL SYNDROME WITH CONSTIPATION: Status: ACTIVE | Noted: 2022-05-17

## 2023-01-26 ENCOUNTER — OFFICE VISIT (OUTPATIENT)
Dept: PRIMARY CARE CLINIC | Age: 53
End: 2023-01-26
Payer: COMMERCIAL

## 2023-01-26 VITALS
WEIGHT: 214.8 LBS | BODY MASS INDEX: 36.87 KG/M2 | SYSTOLIC BLOOD PRESSURE: 110 MMHG | HEART RATE: 58 BPM | OXYGEN SATURATION: 95 % | TEMPERATURE: 98.4 F | DIASTOLIC BLOOD PRESSURE: 76 MMHG

## 2023-01-26 DIAGNOSIS — N18.31 STAGE 3A CHRONIC KIDNEY DISEASE (CKD) (HCC): ICD-10-CM

## 2023-01-26 DIAGNOSIS — K21.9 GASTROESOPHAGEAL REFLUX DISEASE, UNSPECIFIED WHETHER ESOPHAGITIS PRESENT: ICD-10-CM

## 2023-01-26 DIAGNOSIS — R06.09 DYSPNEA ON EXERTION: ICD-10-CM

## 2023-01-26 DIAGNOSIS — R32 URINARY INCONTINENCE, UNSPECIFIED TYPE: ICD-10-CM

## 2023-01-26 DIAGNOSIS — Z91.09 ENVIRONMENTAL ALLERGIES: ICD-10-CM

## 2023-01-26 DIAGNOSIS — J00 ACUTE RHINITIS: Primary | ICD-10-CM

## 2023-01-26 DIAGNOSIS — F33.2 SEVERE EPISODE OF RECURRENT MAJOR DEPRESSIVE DISORDER, WITHOUT PSYCHOTIC FEATURES (HCC): ICD-10-CM

## 2023-01-26 PROBLEM — N92.4 PREMENOPAUSAL MENORRHAGIA: Status: ACTIVE | Noted: 2021-06-16

## 2023-01-26 PROBLEM — Z87.891 PERSONAL HISTORY OF NICOTINE DEPENDENCE: Status: ACTIVE | Noted: 2021-12-13

## 2023-01-26 LAB
ALBUMIN SERPL-MCNC: 3.9 G/DL (ref 3.5–5.2)
ALP BLD-CCNC: 109 U/L (ref 35–104)
ALT SERPL-CCNC: 16 U/L (ref 0–32)
ANION GAP SERPL CALCULATED.3IONS-SCNC: 9 MMOL/L (ref 7–16)
AST SERPL-CCNC: 21 U/L (ref 0–31)
BASOPHILS ABSOLUTE: 0.03 E9/L (ref 0–0.2)
BASOPHILS RELATIVE PERCENT: 0.5 % (ref 0–2)
BILIRUB SERPL-MCNC: 0.3 MG/DL (ref 0–1.2)
BILIRUBIN DIRECT: <0.2 MG/DL (ref 0–0.3)
BILIRUBIN, INDIRECT: ABNORMAL MG/DL (ref 0–1)
BUN BLDV-MCNC: 13 MG/DL (ref 6–20)
CALCIUM SERPL-MCNC: 8.5 MG/DL (ref 8.6–10.2)
CHLORIDE BLD-SCNC: 105 MMOL/L (ref 98–107)
CO2: 26 MMOL/L (ref 22–29)
CREAT SERPL-MCNC: 0.9 MG/DL (ref 0.5–1)
EOSINOPHILS ABSOLUTE: 0.22 E9/L (ref 0.05–0.5)
EOSINOPHILS RELATIVE PERCENT: 3.4 % (ref 0–6)
GFR SERPL CREATININE-BSD FRML MDRD: >60 ML/MIN/1.73
GLUCOSE BLD-MCNC: 73 MG/DL (ref 74–99)
HCT VFR BLD CALC: 41.7 % (ref 34–48)
HEMOGLOBIN: 13.9 G/DL (ref 11.5–15.5)
IMMATURE GRANULOCYTES #: 0.01 E9/L
IMMATURE GRANULOCYTES %: 0.2 % (ref 0–5)
LIPASE: 76 U/L (ref 13–60)
LYMPHOCYTES ABSOLUTE: 1.05 E9/L (ref 1.5–4)
LYMPHOCYTES RELATIVE PERCENT: 16.4 % (ref 20–42)
MCH RBC QN AUTO: 28.7 PG (ref 26–35)
MCHC RBC AUTO-ENTMCNC: 33.3 % (ref 32–34.5)
MCV RBC AUTO: 86 FL (ref 80–99.9)
MONOCYTES ABSOLUTE: 0.69 E9/L (ref 0.1–0.95)
MONOCYTES RELATIVE PERCENT: 10.8 % (ref 2–12)
NEUTROPHILS ABSOLUTE: 4.4 E9/L (ref 1.8–7.3)
NEUTROPHILS RELATIVE PERCENT: 68.7 % (ref 43–80)
PDW BLD-RTO: 13.3 FL (ref 11.5–15)
PLATELET # BLD: 214 E9/L (ref 130–450)
PMV BLD AUTO: 10.4 FL (ref 7–12)
POTASSIUM SERPL-SCNC: 4.2 MMOL/L (ref 3.5–5)
RBC # BLD: 4.85 E12/L (ref 3.5–5.5)
SODIUM BLD-SCNC: 140 MMOL/L (ref 132–146)
TOTAL PROTEIN: 6.7 G/DL (ref 6.4–8.3)
TSH SERPL DL<=0.05 MIU/L-ACNC: 0.73 UIU/ML (ref 0.27–4.2)
WBC # BLD: 6.4 E9/L (ref 4.5–11.5)

## 2023-01-26 PROCEDURE — 3017F COLORECTAL CA SCREEN DOC REV: CPT | Performed by: STUDENT IN AN ORGANIZED HEALTH CARE EDUCATION/TRAINING PROGRAM

## 2023-01-26 PROCEDURE — G8427 DOCREV CUR MEDS BY ELIG CLIN: HCPCS | Performed by: STUDENT IN AN ORGANIZED HEALTH CARE EDUCATION/TRAINING PROGRAM

## 2023-01-26 PROCEDURE — G8484 FLU IMMUNIZE NO ADMIN: HCPCS | Performed by: STUDENT IN AN ORGANIZED HEALTH CARE EDUCATION/TRAINING PROGRAM

## 2023-01-26 PROCEDURE — G8417 CALC BMI ABV UP PARAM F/U: HCPCS | Performed by: STUDENT IN AN ORGANIZED HEALTH CARE EDUCATION/TRAINING PROGRAM

## 2023-01-26 PROCEDURE — 1036F TOBACCO NON-USER: CPT | Performed by: STUDENT IN AN ORGANIZED HEALTH CARE EDUCATION/TRAINING PROGRAM

## 2023-01-26 PROCEDURE — 99214 OFFICE O/P EST MOD 30 MIN: CPT | Performed by: STUDENT IN AN ORGANIZED HEALTH CARE EDUCATION/TRAINING PROGRAM

## 2023-01-26 RX ORDER — PANTOPRAZOLE SODIUM 40 MG/1
40 TABLET, DELAYED RELEASE ORAL DAILY
COMMUNITY

## 2023-01-26 RX ORDER — FLUTICASONE PROPIONATE 50 MCG
2 SPRAY, SUSPENSION (ML) NASAL DAILY
Qty: 16 G | Refills: 0 | Status: SHIPPED | OUTPATIENT
Start: 2023-01-26

## 2023-01-26 RX ORDER — PLECANATIDE 3 MG/1
1 TABLET ORAL
COMMUNITY

## 2023-01-26 RX ORDER — LORATADINE 10 MG/1
10 TABLET ORAL DAILY
Qty: 30 TABLET | Refills: 5 | Status: SHIPPED | OUTPATIENT
Start: 2023-01-26

## 2023-01-26 RX ORDER — FAMOTIDINE 40 MG/1
40 TABLET, FILM COATED ORAL EVERY EVENING
Qty: 30 TABLET | Refills: 5 | Status: SHIPPED | OUTPATIENT
Start: 2023-01-26

## 2023-01-26 ASSESSMENT — PATIENT HEALTH QUESTIONNAIRE - PHQ9
6. FEELING BAD ABOUT YOURSELF - OR THAT YOU ARE A FAILURE OR HAVE LET YOURSELF OR YOUR FAMILY DOWN: 2
3. TROUBLE FALLING OR STAYING ASLEEP: 2
2. FEELING DOWN, DEPRESSED OR HOPELESS: 3
10. IF YOU CHECKED OFF ANY PROBLEMS, HOW DIFFICULT HAVE THESE PROBLEMS MADE IT FOR YOU TO DO YOUR WORK, TAKE CARE OF THINGS AT HOME, OR GET ALONG WITH OTHER PEOPLE: 1
1. LITTLE INTEREST OR PLEASURE IN DOING THINGS: 3
7. TROUBLE CONCENTRATING ON THINGS, SUCH AS READING THE NEWSPAPER OR WATCHING TELEVISION: 2
9. THOUGHTS THAT YOU WOULD BE BETTER OFF DEAD, OR OF HURTING YOURSELF: 0
8. MOVING OR SPEAKING SO SLOWLY THAT OTHER PEOPLE COULD HAVE NOTICED. OR THE OPPOSITE, BEING SO FIGETY OR RESTLESS THAT YOU HAVE BEEN MOVING AROUND A LOT MORE THAN USUAL: 3
5. POOR APPETITE OR OVEREATING: 3
SUM OF ALL RESPONSES TO PHQ QUESTIONS 1-9: 21
SUM OF ALL RESPONSES TO PHQ9 QUESTIONS 1 & 2: 6
SUM OF ALL RESPONSES TO PHQ QUESTIONS 1-9: 21
4. FEELING TIRED OR HAVING LITTLE ENERGY: 3

## 2023-01-26 ASSESSMENT — ENCOUNTER SYMPTOMS
ANAL BLEEDING: 0
SORE THROAT: 1
ABDOMINAL PAIN: 0
COUGH: 1
ABDOMINAL DISTENTION: 1
VOMITING: 0
CONSTIPATION: 0
NAUSEA: 0
SHORTNESS OF BREATH: 1
DIARRHEA: 0

## 2023-01-26 ASSESSMENT — COLUMBIA-SUICIDE SEVERITY RATING SCALE - C-SSRS
6. HAVE YOU EVER DONE ANYTHING, STARTED TO DO ANYTHING, OR PREPARED TO DO ANYTHING TO END YOUR LIFE?: NO
1. WITHIN THE PAST MONTH, HAVE YOU WISHED YOU WERE DEAD OR WISHED YOU COULD GO TO SLEEP AND NOT WAKE UP?: NO
2. HAVE YOU ACTUALLY HAD ANY THOUGHTS OF KILLING YOURSELF?: NO

## 2023-01-26 NOTE — PROGRESS NOTES
ESTABLISHED PRIMARY CARE VISIT    23  Name: Kamille Posey   : 1970   Age: 48 y.o. Sex: female        Assessment & Plan:     Problem List Items Addressed This Visit          Digestive    Gastroesophageal reflux disease     Improved  Following with GI  Continue pantoprazole 40 mg daily, Pepcid 40 mg nightly         Relevant Medications    Plecanatide (TRULANCE) 3 MG TABS    pantoprazole (PROTONIX) 40 MG tablet    famotidine (PEPCID) 40 MG tablet       Genitourinary    Stage 3a chronic kidney disease (CKD) (HCC)     Recheck  Ordered kidney and bladder US due to urinary incontinence         Relevant Orders    US URINARY BLADDER LIMITED    US RETROPERITONEAL LIMITED (Completed)    Basic Metabolic Panel       Other    Environmental allergies     Continue Claritin, nasal saline  Start Flonase as needed         Relevant Medications    loratadine (CLARITIN) 10 MG tablet    fluticasone (FLONASE) 50 MCG/ACT nasal spray    Urinary incontinence     Check bladder US and PVR         Relevant Orders    US URINARY BLADDER LIMITED    US RETROPERITONEAL LIMITED (Completed)    Dyspnea     Patient stopped inhalers due to preference         Relevant Medications    loratadine (CLARITIN) 10 MG tablet    Severe episode of recurrent major depressive disorder, without psychotic features ([facility] [address]     Follows with psych  Continue Prozac 60 mg daily          Other Visit Diagnoses       Acute rhinitis    -  Primary    Allergic vs viral  Declines viral testing  Continue Claritin, nasal saline  Start Flonase as needed    Relevant Medications    fluticasone (FLONASE) 50 MCG/ACT nasal spray          Counseled patient regarding above diagnosis, including possible risks and complications, especially if left uncontrolled. Counseled patient as appropriate and relevant regarding any possible side effects, risks, and alternatives to treatment; the patient verbalizes understanding, and is in agreement with the plan as detailed above. All educational materials and instructions were discussed and included on the After Visit Summary. All questions answered to the patient's satisfaction. The patient was advised to call or send [facility] message for any concerns prior to next appointment. Return in about 3 months (around 4/26/2023) for follow-up. Subjective:     Chief Complaint   Patient presents with    3 Month Follow-Up    Head Congestion     Stuffy nose and scratchy throat  feels due to weather change      Cough     Prod. Patient returns for follow-up  Reports cough, congestion, scratchy throat for 6 days  Thinks this is due to weather change  Has been improving    Psych changed Prozac to 60 mg last month  Has no motivation  She thinks could be due to weather or recent illness  Has follow-up next week    Review of Systems   Constitutional:  Negative for chills and fever. HENT:  Positive for congestion, postnasal drip and sore throat. Respiratory:  Positive for cough and shortness of breath (improved). Cardiovascular:  Positive for palpitations (improved). Negative for chest pain and leg swelling. Gastrointestinal:  Positive for abdominal distention. Negative for abdominal pain, anal bleeding, constipation, diarrhea, nausea and vomiting. Endocrine: Positive for polydipsia and polyuria. Genitourinary:  Positive for frequency and urgency. Negative for difficulty urinating and dysuria. Leakage with abdominal pressure. Allergic/Immunologic: Positive for environmental allergies.      Medications:     Current Outpatient Medications:     Plecanatide (TRULANCE) 3 MG TABS, Take 1 tablet by mouth daily (with breakfast), Disp: , Rfl:     pantoprazole (PROTONIX) 40 MG tablet, Take 40 mg by mouth daily, Disp: , Rfl:     famotidine (PEPCID) 40 MG tablet, take 1 tablet by mouth every evening, Disp: 30 tablet, Rfl: 2    FLUoxetine (PROZAC) 20 MG capsule, Take 60 mg by mouth daily, Disp: , Rfl:     metoprolol tartrate (LOPRESSOR) 25 MG tablet, take 1/2 tablet by mouth twice a day, Disp: , Rfl:     loratadine (CLARITIN) 10 MG tablet, Take 1 tablet by mouth daily, Disp: 30 tablet, Rfl: 5    sodium chloride (ALTAMIST SPRAY) 0.65 % nasal spray, 1 spray by Nasal route as needed for Congestion, Disp: 1 each, Rfl: 5    traZODone (DESYREL) 150 MG tablet, Take 300 mg by mouth nightly, Disp: , Rfl:     ASPIRIN 81 PO, Take 81 mg by mouth daily Take one tablet by mouth once a day, Disp: , Rfl:     ondansetron (ZOFRAN-ODT) 4 MG disintegrating tablet, , Disp: , Rfl:     LINZESS 145 MCG capsule, , Disp: , Rfl:     hydrocortisone 1 % cream, hydrocortisone 1 % topical cream  apply to affected area twice a day for up to 2 weeks (Patient not taking: Reported on 1/26/2023), Disp: , Rfl:     omeprazole (PRILOSEC) 40 MG delayed release capsule, take 1 capsule by mouth EVERY MORNING BEFORE BREAKFAST (Patient not taking: Reported on 1/26/2023), Disp: 30 capsule, Rfl: 5    VYVANSE 40 MG CAPS, take 1 capsule by mouth every morning (Patient not taking: Reported on 1/26/2023), Disp: , Rfl:     Physical Exam:     Vitals:    01/26/23 0807   BP: 110/76   Site: Left Upper Arm   Position: Sitting   Cuff Size: Large Adult   Pulse: 58   Temp: 98.4 °F (36.9 °C)   TempSrc: Temporal   SpO2: 95%   Weight: 214 lb 12.8 oz (97.4 kg)     BP Readings from Last 3 Encounters:   01/26/23 110/76   10/25/22 118/72   08/25/22 116/70     Wt Readings from Last 3 Encounters:   01/26/23 214 lb 12.8 oz (97.4 kg)   10/25/22 214 lb 3.2 oz (97.2 kg)   08/25/22 205 lb 6.4 oz (93.2 kg)     Physical Exam  Vitals and nursing note reviewed. Constitutional:       General: She is not in acute distress. Appearance: Normal appearance. She is obese. She is not ill-appearing or diaphoretic. HENT:      Right Ear: Tympanic membrane, ear canal and external ear normal.      Left Ear: Tympanic membrane, ear canal and external ear normal.      Nose: Mucosal edema and congestion present. No rhinorrhea. Mouth/Throat:      Mouth: Mucous membranes are moist.      Pharynx: Oropharynx is clear. No oropharyngeal exudate or posterior oropharyngeal erythema. Eyes:      Extraocular Movements: Extraocular movements intact. Conjunctiva/sclera: Conjunctivae normal.   Cardiovascular:      Rate and Rhythm: Normal rate and regular rhythm. Heart sounds: Normal heart sounds. Pulmonary:      Effort: Pulmonary effort is normal. No respiratory distress. Breath sounds: Normal breath sounds. No wheezing, rhonchi or rales. Musculoskeletal:      Cervical back: Normal range of motion. Skin:     General: Skin is warm and dry. Neurological:      Mental Status: She is alert and oriented to person, place, and time. Psychiatric:         Attention and Perception: Attention and perception normal.         Mood and Affect: Affect normal. Mood is depressed. Speech: Speech normal.         Behavior: Behavior normal. Behavior is cooperative. Thought Content: Thought content normal.     PHQ-9 Total Score: 21 (1/26/2023  8:13 AM)  Thoughts that you would be better off dead, or of hurting yourself in some way: 0 (1/26/2023  8:13 AM)    Testing:     Orders Placed This Encounter   Procedures    [address]     This procedure can be scheduled via [facility]. Access your [facility] account by visiting [email or web address]. Standing Status:   Future     Standing Expiration Date:   1/26/2024     Scheduling Instructions: With PVR      [address] LIMITED     This procedure can be scheduled via [facility]. Access your [facility] account by visiting [email or web address]. Standing Status:   Future     Standing Expiration Date:   1/26/2024     Scheduling Instructions:      [facility]    Basic Metabolic Panel     Standing Status:   Future     Standing Expiration Date:   1/26/2024      No results found for this or any previous visit (from the past 24 hour(s)).

## 2023-02-02 DIAGNOSIS — N18.31 STAGE 3A CHRONIC KIDNEY DISEASE (CKD) (HCC): ICD-10-CM

## 2023-02-02 DIAGNOSIS — R32 URINARY INCONTINENCE, UNSPECIFIED TYPE: ICD-10-CM

## 2023-02-26 PROBLEM — R32 URINARY INCONTINENCE: Status: ACTIVE | Noted: 2023-02-26

## 2023-02-26 PROBLEM — F33.2 SEVERE EPISODE OF RECURRENT MAJOR DEPRESSIVE DISORDER, WITHOUT PSYCHOTIC FEATURES (HCC): Status: ACTIVE | Noted: 2021-06-16

## 2023-04-27 ENCOUNTER — OFFICE VISIT (OUTPATIENT)
Dept: PRIMARY CARE CLINIC | Age: 53
End: 2023-04-27
Payer: COMMERCIAL

## 2023-04-27 VITALS
TEMPERATURE: 97.4 F | SYSTOLIC BLOOD PRESSURE: 100 MMHG | HEIGHT: 64 IN | HEART RATE: 54 BPM | WEIGHT: 232.2 LBS | DIASTOLIC BLOOD PRESSURE: 72 MMHG | BODY MASS INDEX: 39.64 KG/M2 | OXYGEN SATURATION: 98 %

## 2023-04-27 DIAGNOSIS — E66.9 CLASS 2 OBESITY WITHOUT SERIOUS COMORBIDITY WITH BODY MASS INDEX (BMI) OF 39.0 TO 39.9 IN ADULT, UNSPECIFIED OBESITY TYPE: Primary | ICD-10-CM

## 2023-04-27 PROCEDURE — G8427 DOCREV CUR MEDS BY ELIG CLIN: HCPCS | Performed by: STUDENT IN AN ORGANIZED HEALTH CARE EDUCATION/TRAINING PROGRAM

## 2023-04-27 PROCEDURE — 3017F COLORECTAL CA SCREEN DOC REV: CPT | Performed by: STUDENT IN AN ORGANIZED HEALTH CARE EDUCATION/TRAINING PROGRAM

## 2023-04-27 PROCEDURE — 99213 OFFICE O/P EST LOW 20 MIN: CPT | Performed by: STUDENT IN AN ORGANIZED HEALTH CARE EDUCATION/TRAINING PROGRAM

## 2023-04-27 PROCEDURE — 1036F TOBACCO NON-USER: CPT | Performed by: STUDENT IN AN ORGANIZED HEALTH CARE EDUCATION/TRAINING PROGRAM

## 2023-04-27 PROCEDURE — G8417 CALC BMI ABV UP PARAM F/U: HCPCS | Performed by: STUDENT IN AN ORGANIZED HEALTH CARE EDUCATION/TRAINING PROGRAM

## 2023-04-27 RX ORDER — CARIPRAZINE 3 MG/1
3 CAPSULE, GELATIN COATED ORAL DAILY
COMMUNITY
Start: 2023-04-03

## 2023-04-27 RX ORDER — FLUOXETINE HYDROCHLORIDE 40 MG/1
CAPSULE ORAL
COMMUNITY
Start: 2023-04-03 | End: 2023-04-27 | Stop reason: ALTCHOICE

## 2023-04-27 RX ORDER — CIMETIDINE 300 MG/1
TABLET, FILM COATED ORAL
COMMUNITY
Start: 2023-04-21

## 2023-04-27 RX ORDER — PRUCALOPRIDE 2 MG/1
1 TABLET, FILM COATED ORAL DAILY
COMMUNITY
Start: 2023-04-03

## 2023-04-27 RX ORDER — AMOXICILLIN 250 MG
1-2 CAPSULE ORAL NIGHTLY
COMMUNITY
Start: 2023-02-24

## 2023-04-27 RX ORDER — LISDEXAMFETAMINE DIMESYLATE 40 MG/1
1 CAPSULE ORAL EVERY MORNING
COMMUNITY
Start: 2023-04-03

## 2023-04-27 SDOH — ECONOMIC STABILITY: FOOD INSECURITY: WITHIN THE PAST 12 MONTHS, THE FOOD YOU BOUGHT JUST DIDN'T LAST AND YOU DIDN'T HAVE MONEY TO GET MORE.: NEVER TRUE

## 2023-04-27 SDOH — ECONOMIC STABILITY: INCOME INSECURITY: HOW HARD IS IT FOR YOU TO PAY FOR THE VERY BASICS LIKE FOOD, HOUSING, MEDICAL CARE, AND HEATING?: NOT HARD AT ALL

## 2023-04-27 SDOH — ECONOMIC STABILITY: FOOD INSECURITY: WITHIN THE PAST 12 MONTHS, YOU WORRIED THAT YOUR FOOD WOULD RUN OUT BEFORE YOU GOT MONEY TO BUY MORE.: NEVER TRUE

## 2023-04-27 SDOH — ECONOMIC STABILITY: HOUSING INSECURITY
IN THE LAST 12 MONTHS, WAS THERE A TIME WHEN YOU DID NOT HAVE A STEADY PLACE TO SLEEP OR SLEPT IN A SHELTER (INCLUDING NOW)?: NO

## 2023-04-27 ASSESSMENT — ENCOUNTER SYMPTOMS
ANAL BLEEDING: 0
CONSTIPATION: 1
DIARRHEA: 1
ABDOMINAL DISTENTION: 1
COUGH: 0
SHORTNESS OF BREATH: 1
ABDOMINAL PAIN: 0
NAUSEA: 0
VOMITING: 0

## 2023-05-30 PROBLEM — E66.9 CLASS 2 OBESITY WITHOUT SERIOUS COMORBIDITY WITH BODY MASS INDEX (BMI) OF 39.0 TO 39.9 IN ADULT: Status: ACTIVE | Noted: 2023-05-30

## 2023-05-30 PROBLEM — E66.812 CLASS 2 OBESITY WITHOUT SERIOUS COMORBIDITY WITH BODY MASS INDEX (BMI) OF 39.0 TO 39.9 IN ADULT: Status: ACTIVE | Noted: 2023-05-30

## 2023-05-30 NOTE — ASSESSMENT & PLAN NOTE
Counseled on adequate protein and calorie intake  Continue exercise  Consider medication although would avoid most given chronic bowel issues

## 2023-07-14 DIAGNOSIS — N18.31 STAGE 3A CHRONIC KIDNEY DISEASE (CKD) (HCC): ICD-10-CM

## 2023-07-14 LAB
ANION GAP SERPL CALCULATED.3IONS-SCNC: 13 MMOL/L (ref 7–16)
BUN SERPL-MCNC: 11 MG/DL (ref 6–20)
CALCIUM SERPL-MCNC: 8.4 MG/DL (ref 8.6–10.2)
CHLORIDE SERPL-SCNC: 109 MMOL/L (ref 98–107)
CO2 SERPL-SCNC: 20 MMOL/L (ref 22–29)
CREAT SERPL-MCNC: 1 MG/DL (ref 0.5–1)
GLUCOSE SERPL-MCNC: 69 MG/DL (ref 74–99)
POTASSIUM SERPL-SCNC: 4.1 MMOL/L (ref 3.5–5)
SODIUM SERPL-SCNC: 142 MMOL/L (ref 132–146)

## 2023-08-01 ENCOUNTER — OFFICE VISIT (OUTPATIENT)
Dept: PRIMARY CARE CLINIC | Age: 53
End: 2023-08-01
Payer: COMMERCIAL

## 2023-08-01 VITALS
HEART RATE: 84 BPM | WEIGHT: 230.4 LBS | OXYGEN SATURATION: 96 % | BODY MASS INDEX: 39.34 KG/M2 | HEIGHT: 64 IN | DIASTOLIC BLOOD PRESSURE: 72 MMHG | TEMPERATURE: 97.1 F | SYSTOLIC BLOOD PRESSURE: 104 MMHG

## 2023-08-01 DIAGNOSIS — I49.3 SYMPTOMATIC PVCS: ICD-10-CM

## 2023-08-01 DIAGNOSIS — E66.9 CLASS 2 OBESITY WITHOUT SERIOUS COMORBIDITY WITH BODY MASS INDEX (BMI) OF 39.0 TO 39.9 IN ADULT, UNSPECIFIED OBESITY TYPE: Primary | ICD-10-CM

## 2023-08-01 DIAGNOSIS — R63.5 WEIGHT GAIN DUE TO MEDICATION: ICD-10-CM

## 2023-08-01 DIAGNOSIS — T50.905A WEIGHT GAIN DUE TO MEDICATION: ICD-10-CM

## 2023-08-01 DIAGNOSIS — N18.2 CKD (CHRONIC KIDNEY DISEASE) STAGE 2, GFR 60-89 ML/MIN: ICD-10-CM

## 2023-08-01 PROCEDURE — 3017F COLORECTAL CA SCREEN DOC REV: CPT | Performed by: STUDENT IN AN ORGANIZED HEALTH CARE EDUCATION/TRAINING PROGRAM

## 2023-08-01 PROCEDURE — G8417 CALC BMI ABV UP PARAM F/U: HCPCS | Performed by: STUDENT IN AN ORGANIZED HEALTH CARE EDUCATION/TRAINING PROGRAM

## 2023-08-01 PROCEDURE — G8427 DOCREV CUR MEDS BY ELIG CLIN: HCPCS | Performed by: STUDENT IN AN ORGANIZED HEALTH CARE EDUCATION/TRAINING PROGRAM

## 2023-08-01 PROCEDURE — 99214 OFFICE O/P EST MOD 30 MIN: CPT | Performed by: STUDENT IN AN ORGANIZED HEALTH CARE EDUCATION/TRAINING PROGRAM

## 2023-08-01 PROCEDURE — 1036F TOBACCO NON-USER: CPT | Performed by: STUDENT IN AN ORGANIZED HEALTH CARE EDUCATION/TRAINING PROGRAM

## 2023-08-01 RX ORDER — METFORMIN HYDROCHLORIDE 500 MG/1
500 TABLET, EXTENDED RELEASE ORAL
Qty: 90 TABLET | Refills: 1 | Status: SHIPPED | OUTPATIENT
Start: 2023-08-01

## 2023-08-01 RX ORDER — LUMATEPERONE 42 MG/1
42 CAPSULE ORAL DAILY
COMMUNITY
Start: 2023-07-06

## 2023-08-01 RX ORDER — FLUOXETINE HYDROCHLORIDE 40 MG/1
CAPSULE ORAL
COMMUNITY
Start: 2023-05-02 | End: 2023-08-01

## 2023-08-01 ASSESSMENT — ENCOUNTER SYMPTOMS
CONSTIPATION: 0
COUGH: 0
ABDOMINAL PAIN: 0
DIARRHEA: 0
ABDOMINAL DISTENTION: 1
VOMITING: 0
SHORTNESS OF BREATH: 1
ANAL BLEEDING: 0
NAUSEA: 0

## 2023-08-01 NOTE — ASSESSMENT & PLAN NOTE
Continue metoprolol 25 mg twice daily Hb is 7.5 today , relatively stable. Continue Epogen with dialysis. Will transfuse if Hb<7

## 2023-11-09 ENCOUNTER — OFFICE VISIT (OUTPATIENT)
Dept: PRIMARY CARE CLINIC | Age: 53
End: 2023-11-09
Payer: COMMERCIAL

## 2023-11-09 VITALS
OXYGEN SATURATION: 96 % | WEIGHT: 232.3 LBS | SYSTOLIC BLOOD PRESSURE: 110 MMHG | HEART RATE: 72 BPM | TEMPERATURE: 97.3 F | BODY MASS INDEX: 39.66 KG/M2 | DIASTOLIC BLOOD PRESSURE: 74 MMHG | HEIGHT: 64 IN

## 2023-11-09 DIAGNOSIS — Z51.81 MEDICATION MONITORING ENCOUNTER: ICD-10-CM

## 2023-11-09 DIAGNOSIS — F33.2 SEVERE EPISODE OF RECURRENT MAJOR DEPRESSIVE DISORDER, WITHOUT PSYCHOTIC FEATURES (HCC): ICD-10-CM

## 2023-11-09 DIAGNOSIS — T50.905A WEIGHT GAIN DUE TO MEDICATION: ICD-10-CM

## 2023-11-09 DIAGNOSIS — N95.1 PERIMENOPAUSAL VASOMOTOR SYMPTOMS: ICD-10-CM

## 2023-11-09 DIAGNOSIS — R63.5 WEIGHT GAIN DUE TO MEDICATION: ICD-10-CM

## 2023-11-09 DIAGNOSIS — R06.09 DYSPNEA ON EXERTION: ICD-10-CM

## 2023-11-09 DIAGNOSIS — E66.9 CLASS 2 OBESITY WITHOUT SERIOUS COMORBIDITY WITH BODY MASS INDEX (BMI) OF 39.0 TO 39.9 IN ADULT, UNSPECIFIED OBESITY TYPE: Primary | ICD-10-CM

## 2023-11-09 DIAGNOSIS — Z12.31 BREAST CANCER SCREENING BY MAMMOGRAM: ICD-10-CM

## 2023-11-09 PROBLEM — G43.009 MIGRAINE WITHOUT AURA AND WITHOUT STATUS MIGRAINOSUS, NOT INTRACTABLE: Status: ACTIVE | Noted: 2018-09-18

## 2023-11-09 PROBLEM — N39.46 MIXED INCONTINENCE URGE AND STRESS: Status: ACTIVE | Noted: 2022-08-25

## 2023-11-09 LAB
ALBUMIN SERPL-MCNC: 3.9 G/DL (ref 3.5–5.2)
ALP BLD-CCNC: 92 U/L (ref 35–104)
ALT SERPL-CCNC: 25 U/L (ref 0–32)
ANION GAP SERPL CALCULATED.3IONS-SCNC: 14 MMOL/L (ref 7–16)
AST SERPL-CCNC: 28 U/L (ref 0–31)
BILIRUB SERPL-MCNC: 0.4 MG/DL (ref 0–1.2)
BUN BLDV-MCNC: 13 MG/DL (ref 6–20)
CALCIUM SERPL-MCNC: 8.6 MG/DL (ref 8.6–10.2)
CHLORIDE BLD-SCNC: 107 MMOL/L (ref 98–107)
CHOLESTEROL, FASTING: 233 MG/DL
CO2: 22 MMOL/L (ref 22–29)
CREAT SERPL-MCNC: 1.2 MG/DL (ref 0.5–1)
GFR SERPL CREATININE-BSD FRML MDRD: 58 ML/MIN/1.73M2
GLUCOSE FASTING: 78 MG/DL (ref 74–99)
HBA1C MFR BLD: 4.7 % (ref 4–5.6)
HCT VFR BLD CALC: 42.5 % (ref 34–48)
HDLC SERPL-MCNC: 56 MG/DL
HEMOGLOBIN: 14.3 G/DL (ref 11.5–15.5)
LDL CHOLESTEROL: 159 MG/DL
MCH RBC QN AUTO: 29.7 PG (ref 26–35)
MCHC RBC AUTO-ENTMCNC: 33.6 G/DL (ref 32–34.5)
MCV RBC AUTO: 88.4 FL (ref 80–99.9)
PDW BLD-RTO: 13.3 % (ref 11.5–15)
PLATELET # BLD: 232 K/UL (ref 130–450)
PMV BLD AUTO: 10.5 FL (ref 7–12)
POTASSIUM SERPL-SCNC: 3.9 MMOL/L (ref 3.5–5)
RBC # BLD: 4.81 M/UL (ref 3.5–5.5)
SODIUM BLD-SCNC: 143 MMOL/L (ref 132–146)
TOTAL PROTEIN: 6.7 G/DL (ref 6.4–8.3)
TRIGLYCERIDE, FASTING: 92 MG/DL
TSH SERPL DL<=0.05 MIU/L-ACNC: 1.95 UIU/ML (ref 0.27–4.2)
VITAMIN D 25-HYDROXY: 41.3 NG/ML (ref 30–100)
VLDLC SERPL CALC-MCNC: 18 MG/DL
WBC # BLD: 5.6 K/UL (ref 4.5–11.5)

## 2023-11-09 PROCEDURE — G8427 DOCREV CUR MEDS BY ELIG CLIN: HCPCS | Performed by: STUDENT IN AN ORGANIZED HEALTH CARE EDUCATION/TRAINING PROGRAM

## 2023-11-09 PROCEDURE — 99214 OFFICE O/P EST MOD 30 MIN: CPT | Performed by: STUDENT IN AN ORGANIZED HEALTH CARE EDUCATION/TRAINING PROGRAM

## 2023-11-09 PROCEDURE — 3017F COLORECTAL CA SCREEN DOC REV: CPT | Performed by: STUDENT IN AN ORGANIZED HEALTH CARE EDUCATION/TRAINING PROGRAM

## 2023-11-09 PROCEDURE — G8417 CALC BMI ABV UP PARAM F/U: HCPCS | Performed by: STUDENT IN AN ORGANIZED HEALTH CARE EDUCATION/TRAINING PROGRAM

## 2023-11-09 PROCEDURE — G8484 FLU IMMUNIZE NO ADMIN: HCPCS | Performed by: STUDENT IN AN ORGANIZED HEALTH CARE EDUCATION/TRAINING PROGRAM

## 2023-11-09 PROCEDURE — 1036F TOBACCO NON-USER: CPT | Performed by: STUDENT IN AN ORGANIZED HEALTH CARE EDUCATION/TRAINING PROGRAM

## 2023-11-09 RX ORDER — CIMETIDINE 300 MG/1
TABLET, FILM COATED ORAL
COMMUNITY
Start: 2023-01-27

## 2023-11-09 RX ORDER — METFORMIN HYDROCHLORIDE 500 MG/1
500 TABLET, EXTENDED RELEASE ORAL
Qty: 180 TABLET | Refills: 0 | Status: SHIPPED | OUTPATIENT
Start: 2023-11-09

## 2023-11-09 RX ORDER — LANSOPRAZOLE 30 MG/1
30 CAPSULE, DELAYED RELEASE ORAL DAILY
COMMUNITY
Start: 2023-10-12

## 2023-11-09 RX ORDER — ALBUTEROL SULFATE 90 UG/1
2 AEROSOL, METERED RESPIRATORY (INHALATION) EVERY 4 HOURS PRN
Qty: 18 G | Refills: 2 | Status: SHIPPED | OUTPATIENT
Start: 2023-11-09

## 2023-11-27 DIAGNOSIS — Z12.31 BREAST CANCER SCREENING BY MAMMOGRAM: ICD-10-CM

## 2023-11-28 PROBLEM — N95.1 PERIMENOPAUSAL VASOMOTOR SYMPTOMS: Status: ACTIVE | Noted: 2023-11-28

## 2023-11-28 ASSESSMENT — ENCOUNTER SYMPTOMS
ABDOMINAL DISTENTION: 1
CONSTIPATION: 0
COUGH: 0
VOMITING: 0
ABDOMINAL PAIN: 0
DIARRHEA: 0
SHORTNESS OF BREATH: 1
ANAL BLEEDING: 0
NAUSEA: 0

## 2023-11-28 NOTE — ASSESSMENT & PLAN NOTE
Check labs for alternate causes however consistent with perimenopause  Counseled on risks of HRT including possible increased risk of breast cancer and vascular complications as well as increased migraines for her, patient prefers to proceed for quality of life  Will trial low-dose combined OC

## 2023-11-28 NOTE — ASSESSMENT & PLAN NOTE
Chronic, uncontrolled  Following with psych  Continue Caplyta 42 mg daily, Prozac 20 mg daily, trazodone 150 mg nightly

## 2023-11-28 NOTE — ASSESSMENT & PLAN NOTE
Chronic, unchanged  Continue healthy lifestyle changes  Increase metformin  mg twice daily due to antipsychotic use by psych

## 2024-02-13 ENCOUNTER — TELEPHONE (OUTPATIENT)
Dept: PRIMARY CARE CLINIC | Age: 54
End: 2024-02-13

## 2024-02-13 ENCOUNTER — OFFICE VISIT (OUTPATIENT)
Dept: PRIMARY CARE CLINIC | Age: 54
End: 2024-02-13
Payer: COMMERCIAL

## 2024-02-13 VITALS
HEART RATE: 72 BPM | BODY MASS INDEX: 39.71 KG/M2 | HEIGHT: 64 IN | TEMPERATURE: 97.9 F | SYSTOLIC BLOOD PRESSURE: 110 MMHG | OXYGEN SATURATION: 96 % | WEIGHT: 232.6 LBS | DIASTOLIC BLOOD PRESSURE: 82 MMHG

## 2024-02-13 DIAGNOSIS — F33.2 SEVERE EPISODE OF RECURRENT MAJOR DEPRESSIVE DISORDER, WITHOUT PSYCHOTIC FEATURES (HCC): ICD-10-CM

## 2024-02-13 DIAGNOSIS — T50.905A WEIGHT GAIN DUE TO MEDICATION: ICD-10-CM

## 2024-02-13 DIAGNOSIS — M23.51 RECURRENT RIGHT KNEE INSTABILITY: ICD-10-CM

## 2024-02-13 DIAGNOSIS — E66.9 CLASS 2 OBESITY WITHOUT SERIOUS COMORBIDITY WITH BODY MASS INDEX (BMI) OF 39.0 TO 39.9 IN ADULT, UNSPECIFIED OBESITY TYPE: ICD-10-CM

## 2024-02-13 DIAGNOSIS — R63.5 WEIGHT GAIN DUE TO MEDICATION: ICD-10-CM

## 2024-02-13 DIAGNOSIS — N95.1 PERIMENOPAUSAL VASOMOTOR SYMPTOMS: ICD-10-CM

## 2024-02-13 DIAGNOSIS — Z79.899 LONG-TERM CURRENT USE OF PROTON PUMP INHIBITOR THERAPY: ICD-10-CM

## 2024-02-13 DIAGNOSIS — G89.29 CHRONIC PAIN OF RIGHT KNEE: ICD-10-CM

## 2024-02-13 DIAGNOSIS — M25.561 CHRONIC PAIN OF RIGHT KNEE: ICD-10-CM

## 2024-02-13 DIAGNOSIS — F43.21 GRIEF: Primary | ICD-10-CM

## 2024-02-13 DIAGNOSIS — N17.9 AKI (ACUTE KIDNEY INJURY) (HCC): ICD-10-CM

## 2024-02-13 DIAGNOSIS — N18.2 CKD (CHRONIC KIDNEY DISEASE) STAGE 2, GFR 60-89 ML/MIN: ICD-10-CM

## 2024-02-13 DIAGNOSIS — Z12.31 BREAST CANCER SCREENING BY MAMMOGRAM: ICD-10-CM

## 2024-02-13 DIAGNOSIS — Z59.9 FINANCIAL DIFFICULTY: ICD-10-CM

## 2024-02-13 LAB
ANION GAP SERPL CALCULATED.3IONS-SCNC: 14 MMOL/L (ref 7–16)
BUN BLDV-MCNC: 15 MG/DL (ref 6–20)
CALCIUM SERPL-MCNC: 8.9 MG/DL (ref 8.6–10.2)
CHLORIDE BLD-SCNC: 106 MMOL/L (ref 98–107)
CO2: 23 MMOL/L (ref 22–29)
CREAT SERPL-MCNC: 1 MG/DL (ref 0.5–1)
FOLATE: 5.5 NG/ML (ref 4.8–24.2)
GFR SERPL CREATININE-BSD FRML MDRD: >60 ML/MIN/1.73M2
GLUCOSE BLD-MCNC: 73 MG/DL (ref 74–99)
MAGNESIUM: 2.1 MG/DL (ref 1.6–2.6)
POTASSIUM SERPL-SCNC: 4.6 MMOL/L (ref 3.5–5)
SODIUM BLD-SCNC: 143 MMOL/L (ref 132–146)
VITAMIN B-12: 729 PG/ML (ref 211–946)

## 2024-02-13 PROCEDURE — 3017F COLORECTAL CA SCREEN DOC REV: CPT | Performed by: STUDENT IN AN ORGANIZED HEALTH CARE EDUCATION/TRAINING PROGRAM

## 2024-02-13 PROCEDURE — G8484 FLU IMMUNIZE NO ADMIN: HCPCS | Performed by: STUDENT IN AN ORGANIZED HEALTH CARE EDUCATION/TRAINING PROGRAM

## 2024-02-13 PROCEDURE — G8417 CALC BMI ABV UP PARAM F/U: HCPCS | Performed by: STUDENT IN AN ORGANIZED HEALTH CARE EDUCATION/TRAINING PROGRAM

## 2024-02-13 PROCEDURE — 1036F TOBACCO NON-USER: CPT | Performed by: STUDENT IN AN ORGANIZED HEALTH CARE EDUCATION/TRAINING PROGRAM

## 2024-02-13 PROCEDURE — G8427 DOCREV CUR MEDS BY ELIG CLIN: HCPCS | Performed by: STUDENT IN AN ORGANIZED HEALTH CARE EDUCATION/TRAINING PROGRAM

## 2024-02-13 PROCEDURE — 99214 OFFICE O/P EST MOD 30 MIN: CPT | Performed by: STUDENT IN AN ORGANIZED HEALTH CARE EDUCATION/TRAINING PROGRAM

## 2024-02-13 RX ORDER — FLUOXETINE HYDROCHLORIDE 40 MG/1
40 CAPSULE ORAL DAILY
COMMUNITY
Start: 2024-01-29

## 2024-02-13 SDOH — ECONOMIC STABILITY - INCOME SECURITY: PROBLEM RELATED TO HOUSING AND ECONOMIC CIRCUMSTANCES, UNSPECIFIED: Z59.9

## 2024-02-13 NOTE — ASSESSMENT & PLAN NOTE
Chronic, stable  Continue healthy lifestyle changes  Stopped metformin due to lack of improvement, also stopped Caplyta

## 2024-02-13 NOTE — PROGRESS NOTES
ESTABLISHED PRIMARY CARE VISIT    24  Name: Vale Stack   : 1970   Age: 54 y.o.  Sex: female        Assessment & Plan:     Problem List Items Addressed This Visit          Circulatory    Perimenopausal vasomotor symptoms     Vasomotor symptoms improved  Light spotting every few months  Decided not to use OC            Genitourinary    CKD (chronic kidney disease) stage 2, GFR 60-89 ml/min       Other    Severe episode of recurrent major depressive disorder, without psychotic features (HCC)     Chronic, uncontrolled  Following with psych  Continue Prozac 40 mg daily, trazodone 300 mg nightly         Relevant Medications    FLUoxetine (PROZAC) 40 MG capsule    Class 2 obesity without serious comorbidity with body mass index (BMI) of 39.0 to 39.9 in adult     Chronic, stable  Continue healthy lifestyle changes  Stopped metformin due to lack of improvement, also stopped Caplyta         Weight gain due to medication     Caplyta stopped  Weight stable         Chronic pain of right knee     Acute on chronic  History and exam consistent with patellofemoral syndrome and possible medial meniscus tear  Associated with instability  MR ordered  Referral to ortho         Relevant Orders    MRI KNEE RIGHT WO CONTRAST    External Referral To Orthopedic Surgery     Other Visit Diagnoses       Grief    -  Primary     had stroke  Patient is struggling emotionally and financially  Following with counselor and     Financial difficulty        Recurrent right knee instability        Relevant Orders    MRI KNEE RIGHT WO CONTRAST    External Referral To Orthopedic Surgery    URSULA (acute kidney injury) (HCC)        Suspect due to fasting last labs  Recheck    Relevant Orders    Basic Metabolic Panel    Breast cancer screening by mammogram        Relevant Orders    KANDACE ALEENA DIGITAL SCREEN BILATERAL PER PROTOCOL    Long-term current use of proton pump inhibitor therapy        Relevant Orders    Vitamin

## 2024-02-13 NOTE — TELEPHONE ENCOUNTER
Upon check out, patient asked if she could schedule Mammo after looking at her schedule since she is not due till November. I said that would be fine, but call ASAP so we know if using [facility] or another facility.   MRI faxed to [facility] per patients request.  Ortho wants faxed to [facility]'s upon completion of progress notes.

## 2024-02-13 NOTE — ASSESSMENT & PLAN NOTE
Chronic, uncontrolled  Following with psych  Continue Prozac 40 mg daily, trazodone 300 mg nightly

## 2024-03-05 DIAGNOSIS — G89.29 CHRONIC PAIN OF RIGHT KNEE: ICD-10-CM

## 2024-03-05 DIAGNOSIS — M23.51 RECURRENT RIGHT KNEE INSTABILITY: ICD-10-CM

## 2024-03-05 DIAGNOSIS — M25.561 CHRONIC PAIN OF RIGHT KNEE: ICD-10-CM

## 2024-06-04 ENCOUNTER — TELEPHONE (OUTPATIENT)
Dept: PRIMARY CARE CLINIC | Age: 54
End: 2024-06-04

## 2024-06-04 NOTE — TELEPHONE ENCOUNTER
----- Message from Arline Swenson sent at 6/4/2024  9:08 AM EDT -----  Regarding: [facility] Appointment Request  [facility] Appointment Request    Patient needs appointment for [facility] Appointment Type: Existing Condition Follow Up.    Reason for Appointment Request: Available appointments did not meet patient need. Patient wants to reschedule her appointment this coming June 20, 2024 around 9:00-10:30 am for her follow up check up  --------------------------------------------------------------------------------------------------------------------------    Relationship to Patient: Self     Call Back Information: OK to leave message on voicemail  Preferred Call Back Number: Phone [phone] (home)

## 2024-08-11 DIAGNOSIS — R06.09 DYSPNEA ON EXERTION: ICD-10-CM

## 2024-08-11 DIAGNOSIS — Z91.09 ENVIRONMENTAL ALLERGIES: ICD-10-CM

## 2024-08-13 RX ORDER — LORATADINE 10 MG/1
10 TABLET ORAL DAILY
Qty: 30 TABLET | Refills: 5 | OUTPATIENT
Start: 2024-08-13

## 2024-09-19 ENCOUNTER — OFFICE VISIT (OUTPATIENT)
Dept: PRIMARY CARE CLINIC | Age: 54
End: 2024-09-19
Payer: COMMERCIAL

## 2024-09-19 VITALS
HEART RATE: 81 BPM | OXYGEN SATURATION: 98 % | DIASTOLIC BLOOD PRESSURE: 80 MMHG | RESPIRATION RATE: 17 BRPM | HEIGHT: 64 IN | BODY MASS INDEX: 38.41 KG/M2 | TEMPERATURE: 98 F | SYSTOLIC BLOOD PRESSURE: 128 MMHG | WEIGHT: 225 LBS

## 2024-09-19 DIAGNOSIS — E66.9 CLASS 2 OBESITY WITHOUT SERIOUS COMORBIDITY WITH BODY MASS INDEX (BMI) OF 38.0 TO 38.9 IN ADULT, UNSPECIFIED OBESITY TYPE: ICD-10-CM

## 2024-09-19 DIAGNOSIS — G89.29 CHRONIC MIDLINE LOW BACK PAIN WITH LEFT-SIDED SCIATICA: ICD-10-CM

## 2024-09-19 DIAGNOSIS — M17.11 PRIMARY OSTEOARTHRITIS OF RIGHT KNEE: ICD-10-CM

## 2024-09-19 DIAGNOSIS — R63.5 WEIGHT GAIN DUE TO MEDICATION: ICD-10-CM

## 2024-09-19 DIAGNOSIS — N32.81 OVERACTIVE BLADDER: ICD-10-CM

## 2024-09-19 DIAGNOSIS — M23.51 RECURRENT RIGHT KNEE INSTABILITY: ICD-10-CM

## 2024-09-19 DIAGNOSIS — F43.21 GRIEF: Primary | ICD-10-CM

## 2024-09-19 DIAGNOSIS — M54.42 CHRONIC MIDLINE LOW BACK PAIN WITH LEFT-SIDED SCIATICA: ICD-10-CM

## 2024-09-19 DIAGNOSIS — N39.46 MIXED INCONTINENCE URGE AND STRESS: ICD-10-CM

## 2024-09-19 DIAGNOSIS — T50.905A WEIGHT GAIN DUE TO MEDICATION: ICD-10-CM

## 2024-09-19 DIAGNOSIS — F33.2 SEVERE EPISODE OF RECURRENT MAJOR DEPRESSIVE DISORDER, WITHOUT PSYCHOTIC FEATURES (HCC): ICD-10-CM

## 2024-09-19 PROCEDURE — G2211 COMPLEX E/M VISIT ADD ON: HCPCS | Performed by: STUDENT IN AN ORGANIZED HEALTH CARE EDUCATION/TRAINING PROGRAM

## 2024-09-19 PROCEDURE — 3017F COLORECTAL CA SCREEN DOC REV: CPT | Performed by: STUDENT IN AN ORGANIZED HEALTH CARE EDUCATION/TRAINING PROGRAM

## 2024-09-19 PROCEDURE — 1036F TOBACCO NON-USER: CPT | Performed by: STUDENT IN AN ORGANIZED HEALTH CARE EDUCATION/TRAINING PROGRAM

## 2024-09-19 PROCEDURE — G8427 DOCREV CUR MEDS BY ELIG CLIN: HCPCS | Performed by: STUDENT IN AN ORGANIZED HEALTH CARE EDUCATION/TRAINING PROGRAM

## 2024-09-19 PROCEDURE — 99214 OFFICE O/P EST MOD 30 MIN: CPT | Performed by: STUDENT IN AN ORGANIZED HEALTH CARE EDUCATION/TRAINING PROGRAM

## 2024-09-19 PROCEDURE — G8417 CALC BMI ABV UP PARAM F/U: HCPCS | Performed by: STUDENT IN AN ORGANIZED HEALTH CARE EDUCATION/TRAINING PROGRAM

## 2024-09-19 RX ORDER — METFORMIN HCL 500 MG
500 TABLET, EXTENDED RELEASE 24 HR ORAL
Qty: 30 TABLET | Refills: 5 | Status: SHIPPED | OUTPATIENT
Start: 2024-09-19

## 2024-09-19 RX ORDER — BREXPIPRAZOLE 1 MG/1
1 TABLET ORAL DAILY
COMMUNITY
Start: 2024-09-09

## 2024-09-19 RX ORDER — HYDROXYZINE PAMOATE 25 MG/1
CAPSULE ORAL
COMMUNITY
Start: 2024-09-09

## 2024-09-19 SDOH — ECONOMIC STABILITY: FOOD INSECURITY: WITHIN THE PAST 12 MONTHS, THE FOOD YOU BOUGHT JUST DIDN'T LAST AND YOU DIDN'T HAVE MONEY TO GET MORE.: NEVER TRUE

## 2024-09-19 SDOH — ECONOMIC STABILITY: FOOD INSECURITY: WITHIN THE PAST 12 MONTHS, YOU WORRIED THAT YOUR FOOD WOULD RUN OUT BEFORE YOU GOT MONEY TO BUY MORE.: NEVER TRUE

## 2024-09-19 SDOH — ECONOMIC STABILITY: INCOME INSECURITY: HOW HARD IS IT FOR YOU TO PAY FOR THE VERY BASICS LIKE FOOD, HOUSING, MEDICAL CARE, AND HEATING?: NOT HARD AT ALL

## 2024-09-26 ASSESSMENT — ENCOUNTER SYMPTOMS
COUGH: 0
ABDOMINAL PAIN: 0
SHORTNESS OF BREATH: 1
CONSTIPATION: 0
DIARRHEA: 0

## 2024-11-14 ENCOUNTER — OFFICE VISIT (OUTPATIENT)
Dept: PRIMARY CARE CLINIC | Age: 54
End: 2024-11-14

## 2024-11-14 VITALS
TEMPERATURE: 97.6 F | DIASTOLIC BLOOD PRESSURE: 76 MMHG | WEIGHT: 227 LBS | SYSTOLIC BLOOD PRESSURE: 110 MMHG | HEART RATE: 78 BPM | BODY MASS INDEX: 38.94 KG/M2 | OXYGEN SATURATION: 95 %

## 2024-11-14 DIAGNOSIS — N32.81 OVERACTIVE BLADDER: ICD-10-CM

## 2024-11-14 DIAGNOSIS — N95.1 PERIMENOPAUSAL VASOMOTOR SYMPTOMS: ICD-10-CM

## 2024-11-14 DIAGNOSIS — R63.5 WEIGHT GAIN DUE TO MEDICATION: ICD-10-CM

## 2024-11-14 DIAGNOSIS — I49.3 SYMPTOMATIC PVCS: ICD-10-CM

## 2024-11-14 DIAGNOSIS — R06.09 DYSPNEA ON EXERTION: Primary | ICD-10-CM

## 2024-11-14 DIAGNOSIS — M17.11 PRIMARY OSTEOARTHRITIS OF RIGHT KNEE: ICD-10-CM

## 2024-11-14 DIAGNOSIS — E66.812 CLASS 2 OBESITY WITHOUT SERIOUS COMORBIDITY WITH BODY MASS INDEX (BMI) OF 38.0 TO 38.9 IN ADULT, UNSPECIFIED OBESITY TYPE: ICD-10-CM

## 2024-11-14 DIAGNOSIS — Z23 NEED FOR IMMUNIZATION AGAINST INFLUENZA: ICD-10-CM

## 2024-11-14 DIAGNOSIS — T50.905A WEIGHT GAIN DUE TO MEDICATION: ICD-10-CM

## 2024-11-14 DIAGNOSIS — K43.9 VENTRAL HERNIA WITHOUT OBSTRUCTION OR GANGRENE: ICD-10-CM

## 2024-11-14 RX ORDER — ALBUTEROL SULFATE 90 UG/1
2 INHALANT RESPIRATORY (INHALATION) EVERY 4 HOURS PRN
Qty: 18 G | Refills: 5 | Status: SHIPPED | OUTPATIENT
Start: 2024-11-14

## 2024-11-14 RX ORDER — METFORMIN HYDROCHLORIDE 500 MG/1
500 TABLET, EXTENDED RELEASE ORAL 2 TIMES DAILY WITH MEALS
Qty: 60 TABLET | Refills: 5 | Status: SHIPPED | OUTPATIENT
Start: 2024-11-14

## 2024-11-14 NOTE — ASSESSMENT & PLAN NOTE
Chronic, persistent  Last menses >6 months ago  Failed low-dose GAMAL   Avoid Effexor and Paxil due to psych medications  Patient prefers to avoid gabapentin  Planning to try supplements

## 2024-11-14 NOTE — PROGRESS NOTES
and dysphoric mood. Negative for sleep disturbance and suicidal ideas. The patient is nervous/anxious.      Medications:     Current Outpatient Medications:     REXULTI 1 MG TABS tablet, Take 1 tablet by mouth daily, Disp: , Rfl:     hydrOXYzine pamoate (VISTARIL) 25 MG capsule, TAKE ONE CAPSULE BY MOUTH ONCE A DAY AS NEEDED FOR ANXIETY, Disp: , Rfl:     metFORMIN (GLUCOPHAGE-XR) 500 MG extended release tablet, Take 1 tablet by mouth daily (with breakfast), Disp: 30 tablet, Rfl: 5    diclofenac sodium (VOLTAREN) 1 % GEL, Apply 4 g topically 4 times daily as needed for Pain, Disp: 100 g, Rfl: 5    FLUoxetine (PROZAC) 40 MG capsule, Take 1 capsule by mouth daily, Disp: , Rfl:     lansoprazole (PREVACID) 30 MG delayed release capsule, Take 1 capsule by mouth daily, Disp: , Rfl:     albuterol sulfate HFA (PROVENTIL;VENTOLIN;PROAIR) 108 (90 Base) MCG/ACT inhaler, Inhale 2 puffs into the lungs every 4 hours as needed for Wheezing or Shortness of Breath, Disp: 18 g, Rfl: 2    senna-docusate (PERICOLACE) 8.6-50 MG per tablet, Take 1-2 tablets by mouth nightly, Disp: , Rfl:     MOTEGRITY 2 MG TABS, Take 1 tablet by mouth daily, Disp: , Rfl:     VYVANSE 40 MG CAPS, Take 1 capsule by mouth every morning. Max Daily Amount: 1 capsule, Disp: , Rfl:     traZODone (DESYREL) 150 MG tablet, Take 2 tablets by mouth nightly, Disp: , Rfl:     loratadine (CLARITIN) 10 MG tablet, Take 1 tablet by mouth daily (Patient not taking: Reported on 11/9/2023), Disp: 30 tablet, Rfl: 5    fluticasone (FLONASE) 50 MCG/ACT nasal spray, 2 sprays by Each Nostril route daily (Patient not taking: Reported on 11/9/2023), Disp: 16 g, Rfl: 0    sodium chloride (ALTAMIST SPRAY) 0.65 % nasal spray, 1 spray by Nasal route as needed for Congestion (Patient not taking: Reported on 8/1/2023), Disp: 1 each, Rfl: 5    Physical Exam:     Vitals:    11/14/24 1630   BP: 110/76   Site: Left Upper Arm   Position: Sitting   Cuff Size: Large Adult   Pulse: 78   Temp: 97.6

## 2024-11-14 NOTE — PATIENT INSTRUCTIONS
[facility]  [facility]  [facility]  [address], [place] [address]  Phone: [phone]    Evening primrose oil

## 2024-11-21 PROBLEM — N32.81 OVERACTIVE BLADDER: Status: ACTIVE | Noted: 2022-08-25

## 2024-11-21 ASSESSMENT — ENCOUNTER SYMPTOMS
DIARRHEA: 0
SHORTNESS OF BREATH: 1
COUGH: 0
ABDOMINAL PAIN: 1
CONSTIPATION: 0

## 2024-11-22 NOTE — ASSESSMENT & PLAN NOTE
Likely secondary to Rexulti  Increase metformin  mg twice daily  Continue healthy lifestyle changes

## 2024-11-22 NOTE — ASSESSMENT & PLAN NOTE
Chronic, improved  UAs unremarkable  Bladder US unremarkable without urinary retention  Likely overactive bladder  Declines medication  Continue bladder training and pelvic floor exercises

## 2024-11-22 NOTE — ASSESSMENT & PLAN NOTE
MRI with moderate severe patellar arthritis, chronic tear of ACL  Associated with instability  Continue PT  Referral to new ortho per patient preference, still pending, provided info to call  Continue topical diclofenac

## 2024-11-22 NOTE — ASSESSMENT & PLAN NOTE
>>ASSESSMENT AND PLAN FOR MIXED INCONTINENCE URGE AND STRESS WRITTEN ON 11/21/2024 10:19 PM BY VINCENT MORE MD    Chronic, improved  UAs unremarkable  Bladder US unremarkable without urinary retention  Likely overactive bladder  Declines medication  Continue bladder training and pelvic floor exercises

## 2024-11-22 NOTE — ASSESSMENT & PLAN NOTE
Chronic, stable  Likely secondary to Rexulti  Increase metformin  mg twice daily  Continue healthy lifestyle changes

## 2024-11-22 NOTE — ASSESSMENT & PLAN NOTE
Possible allergies versus asthma, PFTs unremarkable  Previously following with pulmonology  Continue albuterol as needed

## 2025-05-24 SDOH — ECONOMIC STABILITY: FOOD INSECURITY: WITHIN THE PAST 12 MONTHS, YOU WORRIED THAT YOUR FOOD WOULD RUN OUT BEFORE YOU GOT MONEY TO BUY MORE.: NEVER TRUE

## 2025-05-24 SDOH — ECONOMIC STABILITY: INCOME INSECURITY: IN THE LAST 12 MONTHS, WAS THERE A TIME WHEN YOU WERE NOT ABLE TO PAY THE MORTGAGE OR RENT ON TIME?: NO

## 2025-05-24 SDOH — ECONOMIC STABILITY: FOOD INSECURITY: WITHIN THE PAST 12 MONTHS, THE FOOD YOU BOUGHT JUST DIDN'T LAST AND YOU DIDN'T HAVE MONEY TO GET MORE.: NEVER TRUE

## 2025-05-24 SDOH — ECONOMIC STABILITY: TRANSPORTATION INSECURITY
IN THE PAST 12 MONTHS, HAS LACK OF TRANSPORTATION KEPT YOU FROM MEETINGS, WORK, OR FROM GETTING THINGS NEEDED FOR DAILY LIVING?: NO

## 2025-05-24 SDOH — ECONOMIC STABILITY: TRANSPORTATION INSECURITY
IN THE PAST 12 MONTHS, HAS THE LACK OF TRANSPORTATION KEPT YOU FROM MEDICAL APPOINTMENTS OR FROM GETTING MEDICATIONS?: NO

## 2025-05-24 ASSESSMENT — PATIENT HEALTH QUESTIONNAIRE - PHQ9
7. TROUBLE CONCENTRATING ON THINGS, SUCH AS READING THE NEWSPAPER OR WATCHING TELEVISION: NOT AT ALL
SUM OF ALL RESPONSES TO PHQ QUESTIONS 1-9: 6
SUM OF ALL RESPONSES TO PHQ QUESTIONS 1-9: 6
2. FEELING DOWN, DEPRESSED OR HOPELESS: SEVERAL DAYS
10. IF YOU CHECKED OFF ANY PROBLEMS, HOW DIFFICULT HAVE THESE PROBLEMS MADE IT FOR YOU TO DO YOUR WORK, TAKE CARE OF THINGS AT HOME, OR GET ALONG WITH OTHER PEOPLE: NOT DIFFICULT AT ALL
3. TROUBLE FALLING OR STAYING ASLEEP: SEVERAL DAYS
7. TROUBLE CONCENTRATING ON THINGS, SUCH AS READING THE NEWSPAPER OR WATCHING TELEVISION: NOT AT ALL
SUM OF ALL RESPONSES TO PHQ QUESTIONS 1-9: 6
1. LITTLE INTEREST OR PLEASURE IN DOING THINGS: SEVERAL DAYS
8. MOVING OR SPEAKING SO SLOWLY THAT OTHER PEOPLE COULD HAVE NOTICED. OR THE OPPOSITE, BEING SO FIGETY OR RESTLESS THAT YOU HAVE BEEN MOVING AROUND A LOT MORE THAN USUAL: NOT AT ALL
5. POOR APPETITE OR OVEREATING: SEVERAL DAYS
SUM OF ALL RESPONSES TO PHQ QUESTIONS 1-9: 6
4. FEELING TIRED OR HAVING LITTLE ENERGY: SEVERAL DAYS
9. THOUGHTS THAT YOU WOULD BE BETTER OFF DEAD, OR OF HURTING YOURSELF: NOT AT ALL
6. FEELING BAD ABOUT YOURSELF - OR THAT YOU ARE A FAILURE OR HAVE LET YOURSELF OR YOUR FAMILY DOWN: SEVERAL DAYS
10. IF YOU CHECKED OFF ANY PROBLEMS, HOW DIFFICULT HAVE THESE PROBLEMS MADE IT FOR YOU TO DO YOUR WORK, TAKE CARE OF THINGS AT HOME, OR GET ALONG WITH OTHER PEOPLE: NOT DIFFICULT AT ALL
3. TROUBLE FALLING OR STAYING ASLEEP: SEVERAL DAYS
1. LITTLE INTEREST OR PLEASURE IN DOING THINGS: SEVERAL DAYS
5. POOR APPETITE OR OVEREATING: SEVERAL DAYS
6. FEELING BAD ABOUT YOURSELF - OR THAT YOU ARE A FAILURE OR HAVE LET YOURSELF OR YOUR FAMILY DOWN: SEVERAL DAYS
SUM OF ALL RESPONSES TO PHQ QUESTIONS 1-9: 6
9. THOUGHTS THAT YOU WOULD BE BETTER OFF DEAD, OR OF HURTING YOURSELF: NOT AT ALL
8. MOVING OR SPEAKING SO SLOWLY THAT OTHER PEOPLE COULD HAVE NOTICED. OR THE OPPOSITE - BEING SO FIDGETY OR RESTLESS THAT YOU HAVE BEEN MOVING AROUND A LOT MORE THAN USUAL: NOT AT ALL
4. FEELING TIRED OR HAVING LITTLE ENERGY: SEVERAL DAYS
2. FEELING DOWN, DEPRESSED OR HOPELESS: SEVERAL DAYS

## 2025-05-27 ENCOUNTER — OFFICE VISIT (OUTPATIENT)
Dept: PRIMARY CARE CLINIC | Age: 55
End: 2025-05-27
Payer: COMMERCIAL

## 2025-05-27 VITALS
OXYGEN SATURATION: 96 % | DIASTOLIC BLOOD PRESSURE: 84 MMHG | HEART RATE: 72 BPM | BODY MASS INDEX: 41.86 KG/M2 | TEMPERATURE: 97.9 F | SYSTOLIC BLOOD PRESSURE: 122 MMHG | WEIGHT: 244 LBS

## 2025-05-27 DIAGNOSIS — E66.1 CLASS 3 DRUG-INDUCED OBESITY WITHOUT SERIOUS COMORBIDITY WITH BODY MASS INDEX (BMI) OF 40.0 TO 44.9 IN ADULT (HCC): Primary | ICD-10-CM

## 2025-05-27 DIAGNOSIS — Z79.899 LONG-TERM CURRENT USE OF PROTON PUMP INHIBITOR THERAPY: ICD-10-CM

## 2025-05-27 DIAGNOSIS — F33.2 SEVERE EPISODE OF RECURRENT MAJOR DEPRESSIVE DISORDER, WITHOUT PSYCHOTIC FEATURES (HCC): ICD-10-CM

## 2025-05-27 DIAGNOSIS — Z87.891 PERSONAL HISTORY OF TOBACCO USE: ICD-10-CM

## 2025-05-27 DIAGNOSIS — E66.813 CLASS 3 DRUG-INDUCED OBESITY WITHOUT SERIOUS COMORBIDITY WITH BODY MASS INDEX (BMI) OF 40.0 TO 44.9 IN ADULT (HCC): Primary | ICD-10-CM

## 2025-05-27 DIAGNOSIS — Z12.2 SCREENING FOR LUNG CANCER: ICD-10-CM

## 2025-05-27 DIAGNOSIS — T50.905A WEIGHT GAIN DUE TO MEDICATION: ICD-10-CM

## 2025-05-27 DIAGNOSIS — R63.5 WEIGHT GAIN DUE TO MEDICATION: ICD-10-CM

## 2025-05-27 DIAGNOSIS — Z51.81 MEDICATION MONITORING ENCOUNTER: ICD-10-CM

## 2025-05-27 PROCEDURE — 3017F COLORECTAL CA SCREEN DOC REV: CPT | Performed by: STUDENT IN AN ORGANIZED HEALTH CARE EDUCATION/TRAINING PROGRAM

## 2025-05-27 PROCEDURE — G8417 CALC BMI ABV UP PARAM F/U: HCPCS | Performed by: STUDENT IN AN ORGANIZED HEALTH CARE EDUCATION/TRAINING PROGRAM

## 2025-05-27 PROCEDURE — 99214 OFFICE O/P EST MOD 30 MIN: CPT | Performed by: STUDENT IN AN ORGANIZED HEALTH CARE EDUCATION/TRAINING PROGRAM

## 2025-05-27 PROCEDURE — G8427 DOCREV CUR MEDS BY ELIG CLIN: HCPCS | Performed by: STUDENT IN AN ORGANIZED HEALTH CARE EDUCATION/TRAINING PROGRAM

## 2025-05-27 PROCEDURE — 1036F TOBACCO NON-USER: CPT | Performed by: STUDENT IN AN ORGANIZED HEALTH CARE EDUCATION/TRAINING PROGRAM

## 2025-05-27 PROCEDURE — G0296 VISIT TO DETERM LDCT ELIG: HCPCS | Performed by: STUDENT IN AN ORGANIZED HEALTH CARE EDUCATION/TRAINING PROGRAM

## 2025-05-27 RX ORDER — LANSOPRAZOLE 30 MG/1
30 TABLET, ORALLY DISINTEGRATING, DELAYED RELEASE ORAL DAILY
COMMUNITY

## 2025-05-27 RX ORDER — LINACLOTIDE 72 UG/1
72 CAPSULE, GELATIN COATED ORAL DAILY
COMMUNITY
Start: 2025-05-10

## 2025-05-27 ASSESSMENT — ENCOUNTER SYMPTOMS
DIARRHEA: 0
COUGH: 0
ABDOMINAL PAIN: 0
SHORTNESS OF BREATH: 1
CONSTIPATION: 0

## 2025-05-27 NOTE — PROGRESS NOTES
ESTABLISHED PRIMARY CARE VISIT    27  Name: Vale Stack   : 1970   Age: 55 y.o.  Sex: female        Assessment & Plan:     Problem List Items Addressed This Visit       Personal history of tobacco use    Relevant Orders    AL VISIT TO DISCUSS LUNG CA SCREEN W LDCT (Completed)    CT Lung Screen (Initial/Annual/Baseline)    Class 3 severe obesity without serious comorbidity with body mass index (BMI) of 40.0 to 44.9 in adult (HCC) - Primary    Chronic, worsened  Likely secondary to Rexulti, now discontinued, already improving  Failed metformin  Recheck labs, consider GLP1A if abnormal  Continue working on healthy lifestyle changes         Severe episode of recurrent major depressive disorder, without psychotic features (HCC)    Chronic, controlled  Following with psych  Continue trazodone 300 mg nightly         Relevant Orders    CBC    Comprehensive Metabolic Panel, Fasting    TSH    Vitamin D 25 Hydroxy    Weight gain due to medication    Likely secondary to Rexulti  See obesity         Relevant Orders    Hemoglobin A1C    CBC    Comprehensive Metabolic Panel, Fasting    Lipid, Fasting    TSH     Other Visit Diagnoses         Medication monitoring encounter        Check labs for prior recent antipsychotic use    Relevant Orders    Hemoglobin A1C    CBC    Comprehensive Metabolic Panel, Fasting    Lipid, Fasting    TSH      Screening for lung cancer        Relevant Orders    AL VISIT TO DISCUSS LUNG CA SCREEN W LDCT (Completed)    CT Lung Screen (Initial/Annual/Baseline)      Long-term current use of proton pump inhibitor therapy        Relevant Orders    Vitamin B12 & Folate    Magnesium          Discussed with the patient the current USPSTF guidelines released 2021 for screening for lung cancer.    For adults aged 50 to 80 years who have a 20 pack-year smoking history and currently smoke or have quit within the past 15 years the grade B recommendation is to:  Screen for lung cancer  No retinal detachment or retinal tear noted.

## 2025-05-28 NOTE — ASSESSMENT & PLAN NOTE
Chronic, worsened  Likely secondary to Rexulti, now discontinued, already improving  Failed metformin  Recheck labs, consider GLP1A if abnormal  Continue working on healthy lifestyle changes

## 2025-06-03 ENCOUNTER — LAB (OUTPATIENT)
Dept: PRIMARY CARE CLINIC | Age: 55
End: 2025-06-03
Payer: COMMERCIAL

## 2025-06-03 DIAGNOSIS — R06.09 DYSPNEA ON EXERTION: ICD-10-CM

## 2025-06-03 DIAGNOSIS — Z51.81 MEDICATION MONITORING ENCOUNTER: ICD-10-CM

## 2025-06-03 DIAGNOSIS — Z79.899 LONG-TERM CURRENT USE OF PROTON PUMP INHIBITOR THERAPY: ICD-10-CM

## 2025-06-03 DIAGNOSIS — N18.2 CKD (CHRONIC KIDNEY DISEASE) STAGE 2, GFR 60-89 ML/MIN: Primary | ICD-10-CM

## 2025-06-03 DIAGNOSIS — T50.905A WEIGHT GAIN DUE TO MEDICATION: ICD-10-CM

## 2025-06-03 DIAGNOSIS — R63.5 WEIGHT GAIN DUE TO MEDICATION: ICD-10-CM

## 2025-06-03 DIAGNOSIS — F33.2 SEVERE EPISODE OF RECURRENT MAJOR DEPRESSIVE DISORDER, WITHOUT PSYCHOTIC FEATURES (HCC): ICD-10-CM

## 2025-06-03 DIAGNOSIS — K21.9 GASTROESOPHAGEAL REFLUX DISEASE, UNSPECIFIED WHETHER ESOPHAGITIS PRESENT: ICD-10-CM

## 2025-06-03 LAB
HCT VFR BLD CALC: 44.9 % (ref 34–48)
HEMOGLOBIN: 14.3 G/DL (ref 11.5–15.5)
MCH RBC QN AUTO: 29.1 PG (ref 26–35)
MCHC RBC AUTO-ENTMCNC: 31.8 G/DL (ref 32–34.5)
MCV RBC AUTO: 91.4 FL (ref 80–99.9)
PDW BLD-RTO: 13.7 % (ref 11.5–15)
PLATELET # BLD: 225 K/UL (ref 130–450)
PMV BLD AUTO: 10.6 FL (ref 7–12)
RBC # BLD: 4.91 M/UL (ref 3.5–5.5)
WBC # BLD: 6.5 K/UL (ref 4.5–11.5)

## 2025-06-03 PROCEDURE — 36415 COLL VENOUS BLD VENIPUNCTURE: CPT | Performed by: STUDENT IN AN ORGANIZED HEALTH CARE EDUCATION/TRAINING PROGRAM

## 2025-06-04 ENCOUNTER — PATIENT MESSAGE (OUTPATIENT)
Dept: PRIMARY CARE CLINIC | Age: 55
End: 2025-06-04

## 2025-06-04 LAB
ALBUMIN: 3.8 G/DL (ref 3.5–5.2)
ALP BLD-CCNC: 106 U/L (ref 35–104)
ALT SERPL-CCNC: 16 U/L (ref 0–35)
ANION GAP SERPL CALCULATED.3IONS-SCNC: 10 MMOL/L (ref 7–16)
AST SERPL-CCNC: 27 U/L (ref 0–35)
BILIRUB SERPL-MCNC: 0.3 MG/DL (ref 0–1.2)
BUN BLDV-MCNC: 14 MG/DL (ref 6–20)
CALCIUM SERPL-MCNC: 8.9 MG/DL (ref 8.6–10)
CHLORIDE BLD-SCNC: 108 MMOL/L (ref 98–107)
CHOLESTEROL, FASTING: 194 MG/DL
CO2: 24 MMOL/L (ref 22–29)
CREAT SERPL-MCNC: 1.2 MG/DL (ref 0.5–1)
FOLATE: 7.8 NG/ML (ref 4.6–34.8)
GFR, ESTIMATED: 57 ML/MIN/1.73M2
GLUCOSE FASTING: 88 MG/DL (ref 74–99)
HBA1C MFR BLD: 4.9 % (ref 4–5.6)
HDLC SERPL-MCNC: 53 MG/DL
LDL CHOLESTEROL: 117 MG/DL
MAGNESIUM: 2.2 MG/DL (ref 1.6–2.6)
POTASSIUM SERPL-SCNC: 4.8 MMOL/L (ref 3.5–5.1)
SODIUM BLD-SCNC: 142 MMOL/L (ref 136–145)
TOTAL PROTEIN: 6.4 G/DL (ref 6.4–8.3)
TRIGLYCERIDE, FASTING: 119 MG/DL
TSH SERPL DL<=0.05 MIU/L-ACNC: 2.52 UIU/ML (ref 0.27–4.2)
VITAMIN B-12: 431 PG/ML (ref 232–1245)
VITAMIN D 25-HYDROXY: 33.7 NG/ML (ref 30–100)
VLDLC SERPL CALC-MCNC: 24 MG/DL

## 2025-06-16 ENCOUNTER — RESULTS FOLLOW-UP (OUTPATIENT)
Dept: PRIMARY CARE CLINIC | Age: 55
End: 2025-06-16

## 2025-06-16 DIAGNOSIS — E04.2 MULTINODULAR THYROID: ICD-10-CM

## 2025-06-16 DIAGNOSIS — N18.2 CKD (CHRONIC KIDNEY DISEASE) STAGE 2, GFR 60-89 ML/MIN: Primary | ICD-10-CM

## 2025-06-30 PROBLEM — E04.1 LEFT THYROID NODULE: Status: ACTIVE | Noted: 2025-06-30

## 2025-07-07 ENCOUNTER — PATIENT MESSAGE (OUTPATIENT)
Dept: PRIMARY CARE CLINIC | Age: 55
End: 2025-07-07

## 2025-07-17 NOTE — TELEPHONE ENCOUNTER
Spoke with frandy at [facility] scheduling and confirmed fax number I was sending to but they still had not received the order but I could go a head and schedule patient while on the phone with them. Patient scheduled for next Thursday, July 24th at 830. Patient needs to be there at 815 and there is no prep needed.     Patient informed and confirmed appointment day and time was ok.

## 2025-07-25 ENCOUNTER — PATIENT MESSAGE (OUTPATIENT)
Dept: PRIMARY CARE CLINIC | Age: 55
End: 2025-07-25

## 2025-07-25 PROBLEM — E04.2 MULTINODULAR THYROID: Status: ACTIVE | Noted: 2025-06-30

## 2025-08-04 ENCOUNTER — TELEPHONE (OUTPATIENT)
Dept: PRIMARY CARE CLINIC | Age: 55
End: 2025-08-04

## 2025-08-18 DIAGNOSIS — E04.2 MULTINODULAR THYROID: ICD-10-CM

## 2025-08-28 ENCOUNTER — OFFICE VISIT (OUTPATIENT)
Dept: PRIMARY CARE CLINIC | Age: 55
End: 2025-08-28
Payer: COMMERCIAL

## 2025-08-28 VITALS
SYSTOLIC BLOOD PRESSURE: 130 MMHG | DIASTOLIC BLOOD PRESSURE: 82 MMHG | TEMPERATURE: 98 F | HEART RATE: 68 BPM | WEIGHT: 243 LBS | HEIGHT: 64 IN | OXYGEN SATURATION: 98 % | BODY MASS INDEX: 41.48 KG/M2

## 2025-08-28 DIAGNOSIS — R10.9 ACUTE LEFT FLANK PAIN: Primary | ICD-10-CM

## 2025-08-28 DIAGNOSIS — G25.81 RESTLESS LEG: ICD-10-CM

## 2025-08-28 DIAGNOSIS — N18.2 CKD (CHRONIC KIDNEY DISEASE) STAGE 2, GFR 60-89 ML/MIN: ICD-10-CM

## 2025-08-28 DIAGNOSIS — H81.11 BPPV (BENIGN PAROXYSMAL POSITIONAL VERTIGO), RIGHT: ICD-10-CM

## 2025-08-28 DIAGNOSIS — E04.2 MULTINODULAR THYROID: ICD-10-CM

## 2025-08-28 DIAGNOSIS — K21.9 GASTROESOPHAGEAL REFLUX DISEASE, UNSPECIFIED WHETHER ESOPHAGITIS PRESENT: ICD-10-CM

## 2025-08-28 LAB
ANION GAP SERPL CALCULATED.3IONS-SCNC: 12 MMOL/L (ref 7–16)
BILIRUBIN, POC: NORMAL
BLOOD URINE, POC: NORMAL
BUN BLDV-MCNC: 13 MG/DL (ref 6–20)
CALCIUM SERPL-MCNC: 9.4 MG/DL (ref 8.6–10)
CHLORIDE BLD-SCNC: 104 MMOL/L (ref 98–107)
CLARITY, POC: NORMAL
CO2: 23 MMOL/L (ref 22–29)
COLOR, POC: YELLOW
CREAT SERPL-MCNC: 1.1 MG/DL (ref 0.5–1)
FERRITIN: 89 NG/ML
GFR, ESTIMATED: 58 ML/MIN/1.73M2
GLUCOSE BLD-MCNC: 81 MG/DL (ref 74–99)
GLUCOSE URINE, POC: NORMAL MG/DL
IRON % SATURATION: 40 % (ref 15–50)
IRON: 125 UG/DL (ref 37–145)
KETONES, POC: NORMAL MG/DL
LEUKOCYTE EST, POC: NORMAL
NITRITE, POC: NORMAL
PH, POC: 6
POTASSIUM SERPL-SCNC: 5 MMOL/L (ref 3.5–5.1)
PROTEIN, POC: NORMAL MG/DL
SODIUM BLD-SCNC: 139 MMOL/L (ref 136–145)
SPECIFIC GRAVITY, POC: 1.02
TOTAL IRON BINDING CAPACITY: 315 UG/DL (ref 250–450)
UROBILINOGEN, POC: 0.2 MG/DL

## 2025-08-28 PROCEDURE — 36415 COLL VENOUS BLD VENIPUNCTURE: CPT | Performed by: STUDENT IN AN ORGANIZED HEALTH CARE EDUCATION/TRAINING PROGRAM

## 2025-08-28 PROCEDURE — 99214 OFFICE O/P EST MOD 30 MIN: CPT | Performed by: STUDENT IN AN ORGANIZED HEALTH CARE EDUCATION/TRAINING PROGRAM

## 2025-08-28 PROCEDURE — 1036F TOBACCO NON-USER: CPT | Performed by: STUDENT IN AN ORGANIZED HEALTH CARE EDUCATION/TRAINING PROGRAM

## 2025-08-28 PROCEDURE — G8417 CALC BMI ABV UP PARAM F/U: HCPCS | Performed by: STUDENT IN AN ORGANIZED HEALTH CARE EDUCATION/TRAINING PROGRAM

## 2025-08-28 PROCEDURE — G8427 DOCREV CUR MEDS BY ELIG CLIN: HCPCS | Performed by: STUDENT IN AN ORGANIZED HEALTH CARE EDUCATION/TRAINING PROGRAM

## 2025-08-28 PROCEDURE — 3017F COLORECTAL CA SCREEN DOC REV: CPT | Performed by: STUDENT IN AN ORGANIZED HEALTH CARE EDUCATION/TRAINING PROGRAM

## 2025-08-28 PROCEDURE — 81002 URINALYSIS NONAUTO W/O SCOPE: CPT | Performed by: STUDENT IN AN ORGANIZED HEALTH CARE EDUCATION/TRAINING PROGRAM

## 2025-08-28 RX ORDER — ESOMEPRAZOLE MAGNESIUM 40 MG/1
40 CAPSULE, DELAYED RELEASE ORAL
Qty: 30 CAPSULE | Refills: 3 | Status: SHIPPED | OUTPATIENT
Start: 2025-08-28

## 2025-08-28 RX ORDER — FAMOTIDINE 40 MG/1
40 TABLET, FILM COATED ORAL EVERY EVENING
Qty: 30 TABLET | Refills: 3 | Status: SHIPPED | OUTPATIENT
Start: 2025-08-28

## 2025-08-28 SDOH — ECONOMIC STABILITY: FOOD INSECURITY: WITHIN THE PAST 12 MONTHS, THE FOOD YOU BOUGHT JUST DIDN'T LAST AND YOU DIDN'T HAVE MONEY TO GET MORE.: NEVER TRUE

## 2025-08-28 SDOH — ECONOMIC STABILITY: FOOD INSECURITY: WITHIN THE PAST 12 MONTHS, YOU WORRIED THAT YOUR FOOD WOULD RUN OUT BEFORE YOU GOT MONEY TO BUY MORE.: NEVER TRUE

## 2025-08-28 ASSESSMENT — ENCOUNTER SYMPTOMS
CONSTIPATION: 0
DIARRHEA: 0
COUGH: 0
VOMITING: 0
SHORTNESS OF BREATH: 1
ABDOMINAL DISTENTION: 1
BLOOD IN STOOL: 0
BACK PAIN: 1
NAUSEA: 0
ABDOMINAL PAIN: 1

## 2025-08-28 ASSESSMENT — PATIENT HEALTH QUESTIONNAIRE - PHQ9
3. TROUBLE FALLING OR STAYING ASLEEP: NEARLY EVERY DAY
7. TROUBLE CONCENTRATING ON THINGS, SUCH AS READING THE NEWSPAPER OR WATCHING TELEVISION: NOT AT ALL
4. FEELING TIRED OR HAVING LITTLE ENERGY: NEARLY EVERY DAY
SUM OF ALL RESPONSES TO PHQ QUESTIONS 1-9: 11
9. THOUGHTS THAT YOU WOULD BE BETTER OFF DEAD, OR OF HURTING YOURSELF: NOT AT ALL
5. POOR APPETITE OR OVEREATING: MORE THAN HALF THE DAYS
2. FEELING DOWN, DEPRESSED OR HOPELESS: NEARLY EVERY DAY
10. IF YOU CHECKED OFF ANY PROBLEMS, HOW DIFFICULT HAVE THESE PROBLEMS MADE IT FOR YOU TO DO YOUR WORK, TAKE CARE OF THINGS AT HOME, OR GET ALONG WITH OTHER PEOPLE: NOT DIFFICULT AT ALL
6. FEELING BAD ABOUT YOURSELF - OR THAT YOU ARE A FAILURE OR HAVE LET YOURSELF OR YOUR FAMILY DOWN: NOT AT ALL
1. LITTLE INTEREST OR PLEASURE IN DOING THINGS: NOT AT ALL
SUM OF ALL RESPONSES TO PHQ QUESTIONS 1-9: 11
8. MOVING OR SPEAKING SO SLOWLY THAT OTHER PEOPLE COULD HAVE NOTICED. OR THE OPPOSITE, BEING SO FIGETY OR RESTLESS THAT YOU HAVE BEEN MOVING AROUND A LOT MORE THAN USUAL: NOT AT ALL